# Patient Record
Sex: FEMALE | Race: WHITE | HISPANIC OR LATINO | Employment: STUDENT | URBAN - METROPOLITAN AREA
[De-identification: names, ages, dates, MRNs, and addresses within clinical notes are randomized per-mention and may not be internally consistent; named-entity substitution may affect disease eponyms.]

---

## 2020-01-03 ENCOUNTER — HOSPITAL ENCOUNTER (EMERGENCY)
Facility: HOSPITAL | Age: 30
Discharge: HOME | End: 2020-01-03
Attending: EMERGENCY MEDICINE
Payer: OTHER GOVERNMENT

## 2020-01-03 VITALS
RESPIRATION RATE: 18 BRPM | HEART RATE: 66 BPM | OXYGEN SATURATION: 98 % | TEMPERATURE: 98.1 F | SYSTOLIC BLOOD PRESSURE: 119 MMHG | DIASTOLIC BLOOD PRESSURE: 76 MMHG

## 2020-01-03 DIAGNOSIS — J06.9 URI WITH COUGH AND CONGESTION: Primary | ICD-10-CM

## 2020-01-03 PROCEDURE — 99284 EMERGENCY DEPT VISIT MOD MDM: CPT | Performed by: EMERGENCY MEDICINE

## 2020-01-03 PROCEDURE — 99283 EMERGENCY DEPT VISIT LOW MDM: CPT

## 2020-01-03 RX ORDER — FLUTICASONE PROPIONATE 50 MCG
1 SPRAY, SUSPENSION (ML) NASAL DAILY
Qty: 16 G | Refills: 0 | Status: SHIPPED | OUTPATIENT
Start: 2020-01-03 | End: 2020-03-19

## 2020-01-03 NOTE — ED PROVIDER NOTES
History  Chief Complaint   Patient presents with    URI     Pt  reports cough and congestion ongoing since Saturday  Pt  reports feeling better but still with the bad cough/congestion       History provided by:  Patient  URI   Presenting symptoms: congestion and cough    Presenting symptoms: no fever and no sore throat    Severity:  Moderate  Onset quality:  Gradual  Duration:  6 days  Timing:  Constant  Progression:  Improving (Was worse with the 1st 3 days, over the past 3 days has significantly improved)  Chronicity:  New  Relieved by:  None tried  Worsened by:  Nothing  Ineffective treatments:  None tried  Associated symptoms: myalgias    Associated symptoms: no headaches, no neck pain and no wheezing        None       History reviewed  No pertinent past medical history  History reviewed  No pertinent surgical history  History reviewed  No pertinent family history  I have reviewed and agree with the history as documented  Social History     Tobacco Use    Smoking status: Never Smoker    Smokeless tobacco: Never Used   Substance Use Topics    Alcohol use: Never     Frequency: Never    Drug use: Never        Review of Systems   Constitutional: Negative for activity change, chills, diaphoresis and fever  HENT: Positive for congestion  Negative for sinus pressure and sore throat  Eyes: Negative for pain and visual disturbance  Respiratory: Positive for cough  Negative for chest tightness, shortness of breath, wheezing and stridor  Cardiovascular: Negative for chest pain and palpitations  Gastrointestinal: Negative for abdominal distention, abdominal pain, constipation, diarrhea, nausea and vomiting  Genitourinary: Negative for dysuria and frequency  Musculoskeletal: Positive for myalgias  Negative for neck pain and neck stiffness  Skin: Negative for rash  Neurological: Negative for dizziness, speech difficulty, light-headedness, numbness and headaches         Physical Exam  Physical Exam   Constitutional: She is oriented to person, place, and time  She appears well-developed  No distress  HENT:   Head: Normocephalic and atraumatic  Eyes: Pupils are equal, round, and reactive to light  Neck: Normal range of motion  Neck supple  No tracheal deviation present  Cardiovascular: Normal rate, regular rhythm, normal heart sounds and intact distal pulses  No murmur heard  Pulmonary/Chest: Effort normal and breath sounds normal  No stridor  No respiratory distress  Abdominal: Soft  She exhibits no distension  There is no tenderness  There is no rebound and no guarding  Musculoskeletal: Normal range of motion  Neurological: She is alert and oriented to person, place, and time  Skin: Skin is warm and dry  She is not diaphoretic  No erythema  No pallor  Psychiatric: She has a normal mood and affect  Vitals reviewed  Vital Signs  ED Triage Vitals [01/03/20 0639]   Temperature Pulse Respirations Blood Pressure SpO2   98 1 °F (36 7 °C) 66 18 119/76 98 %      Temp Source Heart Rate Source Patient Position - Orthostatic VS BP Location FiO2 (%)   Oral Monitor Lying Right arm --      Pain Score       No Pain           Vitals:    01/03/20 0639   BP: 119/76   Pulse: 66   Patient Position - Orthostatic VS: Lying         Visual Acuity      ED Medications  Medications - No data to display    Diagnostic Studies  Results Reviewed     None                 No orders to display              Procedures  Procedures         ED Course                               MDM  Number of Diagnoses or Management Options  URI with cough and congestion: new and requires workup  Diagnosis management comments: 71-year-old female, URI type symptoms, been occurring for the past 6 days significantly improved over the past 3 days  , likely influenza out of it Tamiflu treatment window, will discharge       Amount and/or Complexity of Data Reviewed  Review and summarize past medical records: yes          Disposition  Final diagnoses:   URI with cough and congestion     Time reflects when diagnosis was documented in both MDM as applicable and the Disposition within this note     Time User Action Codes Description Comment    1/3/2020  7:33 AM Abraham Busch [J06 [id number]] URI with cough and congestion       ED Disposition     ED Disposition Condition Date/Time Comment    Discharge Stable Fri Guanaco 3, 2020  7:33 AM Choco Beverage discharge to home/self care  Follow-up Information    None         Discharge Medication List as of 1/3/2020  7:33 AM      START taking these medications    Details   fluticasone (FLONASE) 50 mcg/act nasal spray 1 spray into each nostril daily, Starting Fri 1/3/2020, Until Sat 1/2/2021, Print           No discharge procedures on file      ED Provider  Electronically Signed by           Lilliana Hugo DO  01/03/20 1137

## 2020-01-09 ENCOUNTER — OFFICE VISIT (OUTPATIENT)
Dept: INTERNAL MEDICINE CLINIC | Facility: CLINIC | Age: 30
End: 2020-01-09
Payer: OTHER GOVERNMENT

## 2020-01-09 VITALS
RESPIRATION RATE: 18 BRPM | WEIGHT: 227.8 LBS | OXYGEN SATURATION: 98 % | TEMPERATURE: 98.6 F | BODY MASS INDEX: 36.61 KG/M2 | HEART RATE: 78 BPM | HEIGHT: 66 IN | SYSTOLIC BLOOD PRESSURE: 112 MMHG | DIASTOLIC BLOOD PRESSURE: 76 MMHG

## 2020-01-09 DIAGNOSIS — E28.2 PCOS (POLYCYSTIC OVARIAN SYNDROME): ICD-10-CM

## 2020-01-09 DIAGNOSIS — Z00.00 ANNUAL PHYSICAL EXAM: Primary | ICD-10-CM

## 2020-01-09 DIAGNOSIS — Z23 NEED FOR VACCINATION: ICD-10-CM

## 2020-01-09 PROCEDURE — 90686 IIV4 VACC NO PRSV 0.5 ML IM: CPT | Performed by: NURSE PRACTITIONER

## 2020-01-09 PROCEDURE — 99385 PREV VISIT NEW AGE 18-39: CPT | Performed by: NURSE PRACTITIONER

## 2020-01-09 PROCEDURE — 90471 IMMUNIZATION ADMIN: CPT | Performed by: NURSE PRACTITIONER

## 2020-01-09 NOTE — PROGRESS NOTES
[address] [facility]    NAME: Lilly Mart  AGE: 30 y o  SEX: female  : 1990     DATE: 2020     Assessment and Plan:     Problem List Items Addressed This Visit     None      Visit Diagnoses     Annual physical exam    -  Primary    Relevant Orders    Comprehensive metabolic panel    Lipid Panel with Direct LDL reflex    PCOS (polycystic ovarian syndrome)        Relevant Orders    CBC and differential    TSH, 3rd generation with Free T4 reflex    Prolactin    FSH and LH    Testosterone, free, total    Need for vaccination        Relevant Orders    influenza vaccine, [id number], quadrivalent, 0 5 mL, preservative-free, for adult and pediatric patients 6 mos+ (AFLURIA, FLUARIX, FLULAVAL, FLUZONE)          Immunizations and preventive care screenings were discussed with patient today  Appropriate education was printed on patient's after visit summary  Counseling:  Alcohol/drug use: discussed moderation in alcohol intake, the recommendations for healthy alcohol use, and avoidance of illicit drug use  Dental Health: discussed importance of regular tooth brushing, flossing, and dental visits  Injury prevention: discussed safety/seat belts, safety helmets, smoke detectors, carbon dioxide detectors, and smoking near bedding or upholstery  Sexual health: discussed sexually transmitted diseases, partner selection, use of condoms, avoidance of unintended pregnancy, and contraceptive alternatives  · Exercise: the importance of regular exercise/physical activity was discussed  Recommend exercise 3-5 times per week for at least 30 minutes  BMI Counseling: Body mass index is 37 33 kg/m²  The BMI is above normal  Nutrition recommendations include decreasing portion sizes, encouraging healthy choices of fruits and vegetables, consuming healthier snacks, limiting drinks that contain sugar and moderation in carbohydrate intake   Exercise recommendations include exercising 3-5 times per week  Return in about 1 year (around 2021) for Annual physical      Chief Complaint:     Chief Complaint   Patient presents with    SLP Initial Evaluation    Cough      History of Present Illness:     Adult Annual Physical   Patient here for a comprehensive physical exam  The patient reports no problems  Recently moved here from [place], going to [place]    She recently had URI, feels better, still has a dry cough, no fevers   She was diagnosed with PCOS 2 years ago, currently not on any medication, looking to start a family soon, hx of 4 pregnancies with 3  labor/1 miscarriage  Smoked for 6 years 1 ppd, quit 2 years ago     Diet and Physical Activity  · Diet/Nutrition: poor diet  · Exercise: no formal exercise  Depression Screening  PHQ-9 Depression Screening    PHQ-9:    Frequency of the following problems over the past two weeks:       Little interest or pleasure in doing things:  0 - not at all  Feeling down, depressed, or hopeless:  0 - not at all  PHQ-2 Score:  0       General Health  · Sleep: sleeps well and gets 7-8 hours of sleep on average  · Hearing: normal - none   · Vision: goes for regular eye exams, most recent eye exam <1 year ago and wears contacts  · Dental: no dental visits for >1 year  /GYN Health  · Last menstrual period: irregular  · Contraceptive method: none     Review of Systems:     Review of Systems   Constitutional: Negative for activity change, appetite change, fatigue and unexpected weight change  Eyes: Negative for visual disturbance  Respiratory: Positive for cough  Negative for chest tightness, shortness of breath and wheezing  Cardiovascular: Negative for chest pain, palpitations and leg swelling  Gastrointestinal: Negative for abdominal pain, blood in stool, constipation and diarrhea  Genitourinary: Positive for menstrual problem   Negative for difficulty urinating and pelvic pain  Musculoskeletal: Negative for arthralgias  Skin: Negative for rash  Neurological: Negative for dizziness, weakness, light-headedness and headaches  Psychiatric/Behavioral: Negative for sleep disturbance  Past Medical History:     Past Medical History:   Diagnosis Date    PCOS (polycystic ovarian syndrome)     Seasonal allergies       Past Surgical History:     History reviewed  No pertinent surgical history     Social History:     Social History     Socioeconomic History    Marital status: /Civil Union     Spouse name: None    Number of children: None    Years of education: None    Highest education level: None   Occupational History    None   Social Needs    Financial resource strain: None    Food insecurity:     Worry: None     Inability: None    Transportation needs:     Medical: None     Non-medical: None   Tobacco Use    Smoking status: Never Smoker    Smokeless tobacco: Never Used   Substance and Sexual Activity    Alcohol use: Yes     Frequency: Monthly or less     Drinks per session: 1 or 2     Binge frequency: Never     Comment: social    Drug use: Never    Sexual activity: None   Lifestyle    Physical activity:     Days per week: None     Minutes per session: None    Stress: None   Relationships    Social connections:     Talks on phone: None     Gets together: None     Attends Confucianism service: None     Active member of club or organization: None     Attends meetings of clubs or organizations: None     Relationship status: None    Intimate partner violence:     Fear of current or ex partner: None     Emotionally abused: None     Physically abused: None     Forced sexual activity: None   Other Topics Concern    None   Social History Narrative    Drinks coffee daily       Family History:     Family History   Problem Relation Age of Onset    Asthma Mother     Arthritis Mother     Asthma Sister     Diabetes Maternal Grandmother     Osteoporosis Maternal Grandmother     Arthritis Maternal Grandmother     Hypertension Paternal Grandmother       Current Medications:     Current Outpatient Medications   Medication Sig Dispense Refill    Inositol-D Chiro-Inositol (OVASITOL PO) Take by mouth      fluticasone (FLONASE) 50 mcg/act nasal spray 1 spray into each nostril daily (Patient not taking: Reported on 1/9/2020) 16 g 0     No current facility-administered medications for this visit  Allergies:     No Known Allergies   Physical Exam:     /76   Pulse 78   Temp 98 6 °F (37 °C)   Resp 18   Ht 5' 5 5" (1 664 m)   Wt 103 kg (227 lb 12 8 oz)   LMP 01/01/2020   SpO2 98%   BMI 37 33 kg/m²     Physical Exam   Constitutional: She is oriented to person, place, and time  She appears well-developed and well-nourished  HENT:   Head: Normocephalic and atraumatic  Mouth/Throat: Oropharynx is clear and moist    Eyes: Pupils are equal, round, and reactive to light  Conjunctivae are normal    Neck: No thyromegaly present  Cardiovascular: Normal rate, regular rhythm, normal heart sounds and intact distal pulses  Pulmonary/Chest: Effort normal and breath sounds normal    Abdominal: Soft  Bowel sounds are normal    Musculoskeletal: Normal range of motion  She exhibits no edema  Lymphadenopathy:     She has no cervical adenopathy  Neurological: She is alert and oriented to person, place, and time  Skin: Skin is warm and dry  Hirsutism on noted on face    Psychiatric: She has a normal mood and affect  Her behavior is normal    Vitals reviewed        [facility]

## 2020-01-09 NOTE — PROGRESS NOTES
Assessment/Plan:    No problem-specific Assessment & Plan notes found for this encounter  There are no diagnoses linked to this encounter  Subjective:      Patient ID: Vonda Lacy is a 30 y o  female  Here today for establish care  Cough       Abnormal hair growth? Acne? Abnormal periods?      Beta HCG  Cbc sed rate crp tsh, cmp  fsh lh  Prolactin   Diagnosed 2 years ago         The following portions of the patient's history were reviewed and updated as appropriate: allergies, current medications, past family history, past medical history, past social history, past surgical history and problem list     Review of Systems      Objective:      /76   Pulse 78   Temp 98 6 °F (37 °C)   Resp 18   Ht 5' 5 5" (1 664 m)   Wt 103 kg (227 lb 12 8 oz)   LMP 01/01/2020   SpO2 98%   BMI 37 33 kg/m²          Physical Exam

## 2020-01-10 ENCOUNTER — TRANSCRIBE ORDERS (OUTPATIENT)
Dept: LAB | Facility: CLINIC | Age: 30
End: 2020-01-10

## 2020-01-10 ENCOUNTER — APPOINTMENT (OUTPATIENT)
Dept: LAB | Facility: CLINIC | Age: 30
End: 2020-01-10
Payer: OTHER GOVERNMENT

## 2020-01-10 DIAGNOSIS — Z00.00 ANNUAL PHYSICAL EXAM: ICD-10-CM

## 2020-01-10 DIAGNOSIS — E28.2 PCOS (POLYCYSTIC OVARIAN SYNDROME): ICD-10-CM

## 2020-01-10 LAB
ALBUMIN SERPL BCP-MCNC: 3.5 G/DL (ref 3.5–5)
ALP SERPL-CCNC: 64 U/L (ref 46–116)
ALT SERPL W P-5'-P-CCNC: 22 U/L (ref 12–78)
ANION GAP SERPL CALCULATED.3IONS-SCNC: 6 MMOL/L (ref 4–13)
AST SERPL W P-5'-P-CCNC: 5 U/L (ref 5–45)
BASOPHILS # BLD AUTO: 0.04 THOUSANDS/ΜL (ref 0–0.1)
BASOPHILS NFR BLD AUTO: 1 % (ref 0–1)
BILIRUB SERPL-MCNC: 0.29 MG/DL (ref 0.2–1)
BUN SERPL-MCNC: 9 MG/DL (ref 5–25)
CALCIUM SERPL-MCNC: 8.7 MG/DL (ref 8.3–10.1)
CHLORIDE SERPL-SCNC: 104 MMOL/L (ref 100–108)
CHOLEST SERPL-MCNC: 191 MG/DL (ref 50–200)
CO2 SERPL-SCNC: 29 MMOL/L (ref 21–32)
CREAT SERPL-MCNC: 0.57 MG/DL (ref 0.6–1.3)
EOSINOPHIL # BLD AUTO: 0.14 THOUSAND/ΜL (ref 0–0.61)
EOSINOPHIL NFR BLD AUTO: 2 % (ref 0–6)
ERYTHROCYTE [DISTWIDTH] IN BLOOD BY AUTOMATED COUNT: 13.6 % (ref 11.6–15.1)
FSH SERPL-ACNC: 5.3 MIU/ML
GFR SERPL CREATININE-BSD FRML MDRD: 127 ML/MIN/1.73SQ M
GLUCOSE P FAST SERPL-MCNC: 100 MG/DL (ref 65–99)
HCT VFR BLD AUTO: 44 % (ref 34.8–46.1)
HDLC SERPL-MCNC: 61 MG/DL
HGB BLD-MCNC: 13.9 G/DL (ref 11.5–15.4)
IMM GRANULOCYTES # BLD AUTO: 0.01 THOUSAND/UL (ref 0–0.2)
IMM GRANULOCYTES NFR BLD AUTO: 0 % (ref 0–2)
LDLC SERPL CALC-MCNC: 112 MG/DL (ref 0–100)
LH SERPL-ACNC: 6.7 MIU/ML
LYMPHOCYTES # BLD AUTO: 2.75 THOUSANDS/ΜL (ref 0.6–4.47)
LYMPHOCYTES NFR BLD AUTO: 41 % (ref 14–44)
MCH RBC QN AUTO: 28.1 PG (ref 26.8–34.3)
MCHC RBC AUTO-ENTMCNC: 31.6 G/DL (ref 31.4–37.4)
MCV RBC AUTO: 89 FL (ref 82–98)
MONOCYTES # BLD AUTO: 0.42 THOUSAND/ΜL (ref 0.17–1.22)
MONOCYTES NFR BLD AUTO: 6 % (ref 4–12)
NEUTROPHILS # BLD AUTO: 3.39 THOUSANDS/ΜL (ref 1.85–7.62)
NEUTS SEG NFR BLD AUTO: 50 % (ref 43–75)
NRBC BLD AUTO-RTO: 0 /100 WBCS
PLATELET # BLD AUTO: 226 THOUSANDS/UL (ref 149–390)
PMV BLD AUTO: 11.2 FL (ref 8.9–12.7)
POTASSIUM SERPL-SCNC: 4.4 MMOL/L (ref 3.5–5.3)
PROLACTIN SERPL-MCNC: 9.5 NG/ML
PROT SERPL-MCNC: 8 G/DL (ref 6.4–8.2)
RBC # BLD AUTO: 4.94 MILLION/UL (ref 3.81–5.12)
SODIUM SERPL-SCNC: 139 MMOL/L (ref 136–145)
TRIGL SERPL-MCNC: 88 MG/DL
TSH SERPL DL<=0.05 MIU/L-ACNC: 1.25 UIU/ML (ref 0.36–3.74)
WBC # BLD AUTO: 6.75 THOUSAND/UL (ref 4.31–10.16)

## 2020-01-10 PROCEDURE — 84146 ASSAY OF PROLACTIN: CPT

## 2020-01-10 PROCEDURE — 85025 COMPLETE CBC W/AUTO DIFF WBC: CPT

## 2020-01-10 PROCEDURE — 80061 LIPID PANEL: CPT

## 2020-01-10 PROCEDURE — 83001 ASSAY OF GONADOTROPIN (FSH): CPT

## 2020-01-10 PROCEDURE — 80053 COMPREHEN METABOLIC PANEL: CPT

## 2020-01-10 PROCEDURE — 83002 ASSAY OF GONADOTROPIN (LH): CPT

## 2020-01-10 PROCEDURE — 36415 COLL VENOUS BLD VENIPUNCTURE: CPT

## 2020-01-10 PROCEDURE — 84403 ASSAY OF TOTAL TESTOSTERONE: CPT

## 2020-01-10 PROCEDURE — 84402 ASSAY OF FREE TESTOSTERONE: CPT

## 2020-01-10 PROCEDURE — 84443 ASSAY THYROID STIM HORMONE: CPT

## 2020-01-13 LAB
TESTOST FREE SERPL-MCNC: 5.4 PG/ML (ref 0–4.2)
TESTOST SERPL-MCNC: 53 NG/DL (ref 8–48)

## 2020-03-19 ENCOUNTER — OFFICE VISIT (OUTPATIENT)
Dept: OBGYN CLINIC | Facility: CLINIC | Age: 30
End: 2020-03-19
Payer: OTHER GOVERNMENT

## 2020-03-19 VITALS — WEIGHT: 226 LBS | BODY MASS INDEX: 36.32 KG/M2 | HEIGHT: 66 IN

## 2020-03-19 DIAGNOSIS — E28.2 PCOS (POLYCYSTIC OVARIAN SYNDROME): ICD-10-CM

## 2020-03-19 DIAGNOSIS — N96 RECURRENT PREGNANCY LOSS: ICD-10-CM

## 2020-03-19 DIAGNOSIS — Z12.4 ENCOUNTER FOR SCREENING FOR MALIGNANT NEOPLASM OF CERVIX: ICD-10-CM

## 2020-03-19 DIAGNOSIS — Z01.419 WELL FEMALE EXAM WITH ROUTINE GYNECOLOGICAL EXAM: Primary | ICD-10-CM

## 2020-03-19 PROCEDURE — 99385 PREV VISIT NEW AGE 18-39: CPT | Performed by: OBSTETRICS & GYNECOLOGY

## 2020-03-19 PROCEDURE — G0145 SCR C/V CYTO,THINLAYER,RESCR: HCPCS | Performed by: OBSTETRICS & GYNECOLOGY

## 2020-03-19 RX ORDER — DIPHENOXYLATE HYDROCHLORIDE AND ATROPINE SULFATE 2.5; .025 MG/1; MG/1
1 TABLET ORAL DAILY
COMMUNITY
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
## 2020-03-27 ENCOUNTER — LAB (OUTPATIENT)
Dept: LAB | Facility: CLINIC | Age: 30
End: 2020-03-27
Payer: OTHER GOVERNMENT

## 2020-03-27 ENCOUNTER — TRANSCRIBE ORDERS (OUTPATIENT)
Dept: LAB | Facility: CLINIC | Age: 30
End: 2020-03-27

## 2020-03-27 DIAGNOSIS — N96 RECURRENT PREGNANCY LOSS: ICD-10-CM

## 2020-03-27 DIAGNOSIS — E28.2 PCOS (POLYCYSTIC OVARIAN SYNDROME): ICD-10-CM

## 2020-03-27 LAB
LAB AP GYN PRIMARY INTERPRETATION: NORMAL
Lab: NORMAL

## 2020-03-27 PROCEDURE — 83001 ASSAY OF GONADOTROPIN (FSH): CPT

## 2020-03-27 PROCEDURE — 85613 RUSSELL VIPER VENOM DILUTED: CPT

## 2020-03-27 PROCEDURE — 82670 ASSAY OF TOTAL ESTRADIOL: CPT

## 2020-03-27 PROCEDURE — 84144 ASSAY OF PROGESTERONE: CPT

## 2020-03-27 PROCEDURE — 86147 CARDIOLIPIN ANTIBODY EA IG: CPT

## 2020-03-27 PROCEDURE — 36415 COLL VENOUS BLD VENIPUNCTURE: CPT

## 2020-03-27 PROCEDURE — 85732 THROMBOPLASTIN TIME PARTIAL: CPT

## 2020-03-27 PROCEDURE — 83036 HEMOGLOBIN GLYCOSYLATED A1C: CPT

## 2020-03-27 PROCEDURE — 86146 BETA-2 GLYCOPROTEIN ANTIBODY: CPT

## 2020-03-27 PROCEDURE — 85705 THROMBOPLASTIN INHIBITION: CPT

## 2020-03-27 PROCEDURE — 85670 THROMBIN TIME PLASMA: CPT

## 2020-03-28 LAB
EST. AVERAGE GLUCOSE BLD GHB EST-MCNC: 120 MG/DL
ESTRADIOL SERPL-MCNC: 39 PG/ML
FSH SERPL-ACNC: 5.2 MIU/ML
HBA1C MFR BLD: 5.8 %
PROGEST SERPL-MCNC: 0.7 NG/ML

## 2020-03-30 LAB
APTT SCREEN TO CONFIRM RATIO: 1.03 RATIO (ref 0–1.4)
B2 GLYCOPROT1 IGA SER-ACNC: <9 GPI IGA UNITS (ref 0–25)
B2 GLYCOPROT1 IGG SER-ACNC: <9 GPI IGG UNITS (ref 0–20)
B2 GLYCOPROT1 IGM SER-ACNC: <9 GPI IGM UNITS (ref 0–32)
CARDIOLIPIN IGA SER IA-ACNC: <9 APL U/ML (ref 0–11)
CARDIOLIPIN IGG SER IA-ACNC: <9 GPL U/ML (ref 0–14)
CARDIOLIPIN IGM SER IA-ACNC: 10 MPL U/ML (ref 0–12)
CONFIRM APTT/NORMAL: 44.1 SEC (ref 0–55)
LA PPP-IMP: NORMAL
SCREEN APTT: 43.3 SEC (ref 0–51.9)
SCREEN DRVVT: 46.4 SEC (ref 0–47)
THROMBIN TIME: 15.6 SEC (ref 0–23)

## 2020-03-31 DIAGNOSIS — E28.2 PCOS (POLYCYSTIC OVARIAN SYNDROME): Primary | ICD-10-CM

## 2020-03-31 DIAGNOSIS — N96 RECURRENT PREGNANCY LOSS: ICD-10-CM

## 2020-03-31 NOTE — RESULT ENCOUNTER NOTE
No antiphospholipid antibodies  Progesterone low -- likely no ovulation, consistent with PCOS  E2 and FSH reordered for day 3 of next cycle

## 2020-04-17 ENCOUNTER — TELEPHONE (OUTPATIENT)
Dept: OTHER | Facility: OTHER | Age: 30
End: 2020-04-17

## 2020-06-14 ENCOUNTER — TRANSCRIBE ORDERS (OUTPATIENT)
Dept: LAB | Facility: CLINIC | Age: 30
End: 2020-06-14

## 2020-06-14 ENCOUNTER — LAB (OUTPATIENT)
Dept: LAB | Facility: CLINIC | Age: 30
End: 2020-06-14
Payer: OTHER GOVERNMENT

## 2020-06-14 DIAGNOSIS — E28.2 PCOS (POLYCYSTIC OVARIAN SYNDROME): ICD-10-CM

## 2020-06-14 LAB
ESTRADIOL SERPL-MCNC: 37 PG/ML
FSH SERPL-ACNC: 5.2 MIU/ML

## 2020-06-14 PROCEDURE — 83001 ASSAY OF GONADOTROPIN (FSH): CPT

## 2020-06-14 PROCEDURE — 82670 ASSAY OF TOTAL ESTRADIOL: CPT

## 2020-06-14 PROCEDURE — 83516 IMMUNOASSAY NONANTIBODY: CPT

## 2020-06-14 PROCEDURE — 36415 COLL VENOUS BLD VENIPUNCTURE: CPT

## 2020-06-18 LAB — MIS SERPL-MCNC: 5.92 NG/ML

## 2022-01-16 ENCOUNTER — HOSPITAL ENCOUNTER (EMERGENCY)
Facility: HOSPITAL | Age: 32
Discharge: HOME | End: 2022-01-16
Attending: EMERGENCY MEDICINE
Payer: OTHER GOVERNMENT

## 2022-01-16 VITALS
OXYGEN SATURATION: 98 % | HEART RATE: 90 BPM | SYSTOLIC BLOOD PRESSURE: 136 MMHG | TEMPERATURE: 98.7 F | DIASTOLIC BLOOD PRESSURE: 78 MMHG | RESPIRATION RATE: 18 BRPM

## 2022-01-16 DIAGNOSIS — U07.1 COVID-19: Primary | ICD-10-CM

## 2022-01-16 PROCEDURE — 99284 EMERGENCY DEPT VISIT MOD MDM: CPT | Performed by: EMERGENCY MEDICINE

## 2022-01-16 PROCEDURE — 99284 EMERGENCY DEPT VISIT MOD MDM: CPT

## 2022-01-17 NOTE — ED PROVIDER NOTES
History  Chief Complaint   Patient presents with    Shortness of Breath     C/o SOB, nasal congestion, fever and cough starting yesterday  Tested positive for covid  Dago Cousins is a 28 y o  female who presents with chief complaint of a fever, cough, nasal congestion and body aches that started yesterday  He  had similar symptoms start a few days prior  She was tested yesterday for COVID and tested positive  She does not have any respiratory distress and denies shortness of breath  Her oxygen saturation is 98% on room air  She has no pulmonary disease at baseline  She is not immunized  She has been taking Emergen-C OTC medications  History provided by:  Patient   used: No    URI  Presenting symptoms: congestion, cough, fatigue, fever and sore throat    Severity:  Mild  Onset quality:  Gradual  Duration:  2 days  Timing:  Constant  Progression:  Unchanged  Chronicity:  New  Relieved by:  OTC medications  Worsened by:  Nothing  Ineffective treatments:  None tried  Associated symptoms: myalgias    Risk factors: sick contacts        Prior to Admission Medications   Prescriptions Last Dose Informant Patient Reported? Taking?    Inositol-D Chiro-Inositol (OVASITOL PO)  Self Yes No   Sig: Take by mouth   multivitamin (THERAGRAN) TABS   Yes No   Sig: Take 1 tablet by mouth daily      Facility-Administered Medications: None       Past Medical History:   Diagnosis Date    Heart murmur 2010    PCOS (polycystic ovarian syndrome)     Seasonal allergies        Past Surgical History:   Procedure Laterality Date    CERVICAL CERCLAGE  2009    FOOT SURGERY Right        Family History   Problem Relation Age of Onset    Asthma Mother     Arthritis Mother     Asthma Sister     Diabetes Maternal Grandmother     Osteoporosis Maternal Grandmother     Arthritis Maternal Grandmother     Hypertension Paternal Grandmother      I have reviewed and agree with the history as documented  E-Cigarette/Vaping    E-Cigarette Use Former User     Comments quit 2016      E-Cigarette/Vaping Substances    Nicotine No     THC No     CBD No     Flavoring No     Other No     Unknown No      Social History     Tobacco Use    Smoking status: Former Smoker    Smokeless tobacco: Never Used   Vaping Use    Vaping Use: Former   Substance Use Topics    Alcohol use: Yes     Comment: social    Drug use: Never       Review of Systems   Constitutional: Positive for fatigue and fever  Negative for chills and diaphoresis  HENT: Positive for congestion and sore throat  Respiratory: Positive for cough  Negative for shortness of breath  Cardiovascular: Negative for chest pain and palpitations  Gastrointestinal: Negative for diarrhea, nausea and vomiting  Genitourinary: Negative for dysuria and frequency  Musculoskeletal: Positive for myalgias  Skin: Negative for rash  All other systems reviewed and are negative  Physical Exam  Physical Exam  Vitals and nursing note reviewed  Constitutional:       General: She is not in acute distress  Appearance: She is well-developed  HENT:      Head: Normocephalic and atraumatic  Eyes:      Pupils: Pupils are equal, round, and reactive to light  Neck:      Vascular: No JVD  Cardiovascular:      Rate and Rhythm: Normal rate and regular rhythm  Heart sounds: Normal heart sounds  No murmur heard  No friction rub  No gallop  Pulmonary:      Effort: Pulmonary effort is normal  No respiratory distress  Breath sounds: Normal breath sounds  No wheezing or rales  Chest:      Chest wall: No tenderness  Musculoskeletal:         General: No tenderness  Normal range of motion  Cervical back: Normal range of motion  Skin:     General: Skin is warm and dry  Neurological:      General: No focal deficit present  Mental Status: She is alert and oriented to person, place, and time     Psychiatric:         Behavior: Behavior normal          Thought Content: Thought content normal          Judgment: Judgment normal          Vital Signs  ED Triage Vitals   Temperature Pulse Respirations Blood Pressure SpO2   01/16/22 2129 01/16/22 2128 01/16/22 2128 01/16/22 2128 01/16/22 2128   98 7 °F (37 1 °C) 90 18 136/78 98 %      Temp Source Heart Rate Source Patient Position - Orthostatic VS BP Location FiO2 (%)   01/16/22 2128 01/16/22 2128 01/16/22 2128 01/16/22 2128 --   Oral Monitor Sitting Left arm       Pain Score       --                  Vitals:    01/16/22 2128   BP: 136/78   Pulse: 90   Patient Position - Orthostatic VS: Sitting         Visual Acuity      ED Medications  Medications - No data to display    Diagnostic Studies  Results Reviewed     None                 No orders to display              Procedures  Procedures         ED Course                               SBIRT 20yo+      Most Recent Value   SBIRT (23 yo +)    In order to provide better care to our patients, we are screening all of our patients for alcohol and drug use  Would it be okay to ask you these screening questions? No Filed at: 01/16/2022 2141                    [facility]  Number of Diagnoses or Management Options  COVID-19: new and does not require workup  Diagnosis management comments: Patient with known COVID presenting with symptoms consistent with COVID  Nothing to suggest severe disease at this time  Counseling regarding best supportive therapies and reasons for return given  Patient Progress  Patient progress: stable      Disposition  Final diagnoses:   [id number]     Time reflects when diagnosis was documented in both MDM as applicable and the Disposition within this note     Time User Action Codes Description Comment    1/16/2022  9:47 PM Lisa Lopez Add [U07 1] COVID-19       ED Disposition     ED Disposition Condition Date/Time Comment    Discharge Stable Sun Jan 16, 2022  9:47 PM Maurice Flowers discharge to home/self care              Follow-up Information     Follow up With Specialties Details Why [address] [facility] [place] Internal Medicine, Nurse Practitioner Schedule an appointment as soon as possible for a visit in 1 week  Red Harrison [address] [place] [address]  [phone]            Patient's Medications   Discharge Prescriptions    No medications on file       No discharge procedures on file      PDMP Review     None          ED Provider  Electronically Signed by           Rohit Ryan MD  01/16/22 5854

## 2022-01-21 ENCOUNTER — APPOINTMENT (EMERGENCY)
Dept: RADIOLOGY | Facility: HOSPITAL | Age: 32
End: 2022-01-21
Payer: OTHER GOVERNMENT

## 2022-01-21 ENCOUNTER — HOSPITAL ENCOUNTER (EMERGENCY)
Facility: HOSPITAL | Age: 32
Discharge: HOME | End: 2022-01-21
Attending: EMERGENCY MEDICINE
Payer: OTHER GOVERNMENT

## 2022-01-21 VITALS
OXYGEN SATURATION: 98 % | DIASTOLIC BLOOD PRESSURE: 76 MMHG | TEMPERATURE: 98.7 F | RESPIRATION RATE: 20 BRPM | HEART RATE: 72 BPM | SYSTOLIC BLOOD PRESSURE: 124 MMHG

## 2022-01-21 DIAGNOSIS — R09.81 NASAL CONGESTION: ICD-10-CM

## 2022-01-21 DIAGNOSIS — J06.9 URI (UPPER RESPIRATORY INFECTION): Primary | ICD-10-CM

## 2022-01-21 LAB
ALBUMIN SERPL BCP-MCNC: 3.5 G/DL (ref 3.5–5)
ALP SERPL-CCNC: 54 U/L (ref 46–116)
ALT SERPL W P-5'-P-CCNC: 24 U/L (ref 12–78)
ANION GAP SERPL CALCULATED.3IONS-SCNC: 8 MMOL/L (ref 4–13)
AST SERPL W P-5'-P-CCNC: 15 U/L (ref 5–45)
ATRIAL RATE: 69 BPM
BASOPHILS # BLD AUTO: 0.01 THOUSANDS/ΜL (ref 0–0.1)
BASOPHILS NFR BLD AUTO: 0 % (ref 0–1)
BILIRUB SERPL-MCNC: <0.1 MG/DL (ref 0.2–1)
BUN SERPL-MCNC: 8 MG/DL (ref 5–25)
CALCIUM SERPL-MCNC: 9.2 MG/DL (ref 8.3–10.1)
CARDIAC TROPONIN I PNL SERPL HS: 2 NG/L
CHLORIDE SERPL-SCNC: 103 MMOL/L (ref 100–108)
CO2 SERPL-SCNC: 29 MMOL/L (ref 21–32)
CREAT SERPL-MCNC: 0.64 MG/DL (ref 0.6–1.3)
EOSINOPHIL # BLD AUTO: 0.04 THOUSAND/ΜL (ref 0–0.61)
EOSINOPHIL NFR BLD AUTO: 1 % (ref 0–6)
ERYTHROCYTE [DISTWIDTH] IN BLOOD BY AUTOMATED COUNT: 13.9 % (ref 11.6–15.1)
GFR SERPL CREATININE-BSD FRML MDRD: 120 ML/MIN/1.73SQ M
GLUCOSE SERPL-MCNC: 94 MG/DL (ref 65–140)
HCT VFR BLD AUTO: 40.8 % (ref 34.8–46.1)
HGB BLD-MCNC: 13.4 G/DL (ref 11.5–15.4)
IMM GRANULOCYTES # BLD AUTO: 0.01 THOUSAND/UL (ref 0–0.2)
IMM GRANULOCYTES NFR BLD AUTO: 0 % (ref 0–2)
LYMPHOCYTES # BLD AUTO: 2.46 THOUSANDS/ΜL (ref 0.6–4.47)
LYMPHOCYTES NFR BLD AUTO: 55 % (ref 14–44)
MCH RBC QN AUTO: 28.6 PG (ref 26.8–34.3)
MCHC RBC AUTO-ENTMCNC: 32.8 G/DL (ref 31.4–37.4)
MCV RBC AUTO: 87 FL (ref 82–98)
MONOCYTES # BLD AUTO: 0.28 THOUSAND/ΜL (ref 0.17–1.22)
MONOCYTES NFR BLD AUTO: 6 % (ref 4–12)
NEUTROPHILS # BLD AUTO: 1.71 THOUSANDS/ΜL (ref 1.85–7.62)
NEUTS SEG NFR BLD AUTO: 38 % (ref 43–75)
NRBC BLD AUTO-RTO: 0 /100 WBCS
P AXIS: 52 DEGREES
PLATELET # BLD AUTO: 198 THOUSANDS/UL (ref 149–390)
PMV BLD AUTO: 10.9 FL (ref 8.9–12.7)
POTASSIUM SERPL-SCNC: 3.9 MMOL/L (ref 3.5–5.3)
PR INTERVAL: 138 MS
PROT SERPL-MCNC: 7.8 G/DL (ref 6.4–8.2)
QRS AXIS: 5 DEGREES
QRSD INTERVAL: 86 MS
QT INTERVAL: 388 MS
QTC INTERVAL: 407 MS
RBC # BLD AUTO: 4.69 MILLION/UL (ref 3.81–5.12)
SODIUM SERPL-SCNC: 140 MMOL/L (ref 136–145)
T WAVE AXIS: 13 DEGREES
VENTRICULAR RATE: 66 BPM
WBC # BLD AUTO: 4.51 THOUSAND/UL (ref 4.31–10.16)

## 2022-01-21 PROCEDURE — 93010 ELECTROCARDIOGRAM REPORT: CPT | Performed by: INTERNAL MEDICINE

## 2022-01-21 PROCEDURE — 96360 HYDRATION IV INFUSION INIT: CPT

## 2022-01-21 PROCEDURE — 80053 COMPREHEN METABOLIC PANEL: CPT | Performed by: PHYSICIAN ASSISTANT

## 2022-01-21 PROCEDURE — 36415 COLL VENOUS BLD VENIPUNCTURE: CPT | Performed by: PHYSICIAN ASSISTANT

## 2022-01-21 PROCEDURE — 93005 ELECTROCARDIOGRAM TRACING: CPT

## 2022-01-21 PROCEDURE — 84484 ASSAY OF TROPONIN QUANT: CPT | Performed by: PHYSICIAN ASSISTANT

## 2022-01-21 PROCEDURE — 85025 COMPLETE CBC W/AUTO DIFF WBC: CPT | Performed by: PHYSICIAN ASSISTANT

## 2022-01-21 PROCEDURE — 71045 X-RAY EXAM CHEST 1 VIEW: CPT

## 2022-01-21 PROCEDURE — 99285 EMERGENCY DEPT VISIT HI MDM: CPT | Performed by: PHYSICIAN ASSISTANT

## 2022-01-21 PROCEDURE — 99285 EMERGENCY DEPT VISIT HI MDM: CPT

## 2022-01-21 RX ORDER — MELATONIN
2000 DAILY
Qty: 28 TABLET | Refills: 0 | Status: SHIPPED | OUTPATIENT
Start: 2022-01-21 | End: 2022-02-04

## 2022-01-21 RX ORDER — LORATADINE 10 MG/1
10 TABLET ORAL DAILY
Qty: 3 TABLET | Refills: 0 | Status: SHIPPED | OUTPATIENT
Start: 2022-01-21 | End: 2022-01-24

## 2022-01-21 RX ORDER — MULTIVIT WITH MINERALS/LUTEIN
1000 TABLET ORAL 2 TIMES DAILY
Qty: 28 TABLET | Refills: 0 | Status: SHIPPED | OUTPATIENT
Start: 2022-01-21 | End: 2022-02-04

## 2022-01-21 RX ORDER — OXYMETAZOLINE HYDROCHLORIDE 0.05 G/100ML
2 SPRAY NASAL ONCE
Status: COMPLETED | OUTPATIENT
Start: 2022-01-21 | End: 2022-01-21

## 2022-01-21 RX ORDER — ALBUTEROL SULFATE 90 UG/1
2 AEROSOL, METERED RESPIRATORY (INHALATION) ONCE
Status: COMPLETED | OUTPATIENT
Start: 2022-01-21 | End: 2022-01-21

## 2022-01-21 RX ORDER — MULTIVITAMIN
1 TABLET ORAL DAILY
Qty: 14 TABLET | Refills: 0 | Status: SHIPPED | OUTPATIENT
Start: 2022-01-21 | End: 2022-02-04

## 2022-01-21 RX ORDER — LORATADINE 10 MG/1
10 TABLET ORAL ONCE
Status: COMPLETED | OUTPATIENT
Start: 2022-01-21 | End: 2022-01-21

## 2022-01-21 RX ADMIN — SODIUM CHLORIDE 1000 ML: 0.9 INJECTION, SOLUTION INTRAVENOUS at 01:53

## 2022-01-21 RX ADMIN — ALBUTEROL SULFATE 2 PUFF: 90 AEROSOL, METERED RESPIRATORY (INHALATION) at 01:33

## 2022-01-21 RX ADMIN — OXYMETAZOLINE HCL 2 SPRAY: 0.05 SPRAY NASAL at 01:33

## 2022-01-21 RX ADMIN — LORATADINE 10 MG: 10 TABLET ORAL at 01:33

## 2022-01-21 NOTE — DISCHARGE INSTRUCTIONS
Take Claritin trauma multivitamins, vitamin-D vitamin-C as indicated  Use Ventolin inhaler as needed  Use Afrin as indicated; 1 spray each nostril every 12 hours no longer than 3 days  Patient hemodynamically stable and afebrile  Consume plenty fluids and electrolytes  Obtain pulse oximeter and it is advised that you return to the hospital should SP O2 saturation dropped below 92%  Maintain quarantine precautions  Follow-up with PCP  Follow up emergency department symptoms persist or exacerbate

## 2022-01-21 NOTE — Clinical Note
Robin Prieto was seen and treated in our emergency department on 1/21/2022  Diagnosis:     Joe Artis    She may return on this date: 01/24/2022         If you have any questions or concerns, please don't hesitate to call        Phil Mistry PA-C    ______________________________           _______________          _______________  Hospital Representative                              Date                                Time

## 2022-01-21 NOTE — ED PROVIDER NOTES
History  Chief Complaint   Patient presents with    Shortness of Breath     pt reports shortness of breath and tightness in throat that started about 30 min ago  pt tested positive for covid on saturday   Patient is a 45-year-old female with history of PCOS that presents emergency depart with intermittent dry nonproductive cough for 6 days  Patient has associated symptomatology of shortness of breath, dull aching nonradiating throat pain and chest tightness beginning the current presentation of cough  Patient states that she is COVID positive and noted that she had shortness of breath symptoms that a worsening today and she decided to comes emergency department for further evaluation  Patient denies history of DVT, PE, thrombophlebitis  Patient denies palliative factors with provocative factors activity/exertion  Patient affirms not effective treatment of Mucinex  Patient denies fevers, chills, nausea, vomiting, diarrhea, constipation urinary symptoms  Patient's recent fall or recent trauma  Patient denies recent antibiotic use  Patient denies recent travel  Patient affirms possible sick contacts; patient peers  Patient denies chest pain and abdominal pain  History provided by:  Patient   used: No        Prior to Admission Medications   Prescriptions Last Dose Informant Patient Reported? Taking?    Inositol-D Chiro-Inositol (OVASITOL PO)  Self Yes No   Sig: Take by mouth   multivitamin (THERAGRAN) TABS   Yes No   Sig: Take 1 tablet by mouth daily      Facility-Administered Medications: None       Past Medical History:   Diagnosis Date    Heart murmur 2010    PCOS (polycystic ovarian syndrome)     Seasonal allergies        Past Surgical History:   Procedure Laterality Date    CERVICAL CERCLAGE  2009    FOOT SURGERY Right        Family History   Problem Relation Age of Onset    Asthma Mother     Arthritis Mother     Asthma Sister     Diabetes Maternal Grandmother     Osteoporosis Maternal Grandmother     Arthritis Maternal Grandmother     Hypertension Paternal Grandmother      I have reviewed and agree with the history as documented  E-Cigarette/Vaping    E-Cigarette Use Former User     Comments quit 2016      E-Cigarette/Vaping Substances    Nicotine No     THC No     CBD No     Flavoring No     Other No     Unknown No      Social History     Tobacco Use    Smoking status: Former Smoker    Smokeless tobacco: Never Used   Vaping Use    Vaping Use: Former   Substance Use Topics    Alcohol use: Yes     Comment: social    Drug use: Never       Review of Systems    Physical Exam  Physical Exam  Vitals and nursing note reviewed  Constitutional:       General: She is awake  Appearance: Normal appearance  She is well-developed  She is not ill-appearing, toxic-appearing or diaphoretic  Comments: /76 (BP Location: Left arm)   Pulse 72   Temp 98 7 °F (37 1 °C) (Oral)   Resp 20   SpO2 98%      HENT:      Head: Normocephalic and atraumatic  Right Ear: Hearing and external ear normal  No decreased hearing noted  No drainage, swelling or tenderness  No mastoid tenderness  Left Ear: Hearing and external ear normal  No decreased hearing noted  No drainage, swelling or tenderness  No mastoid tenderness  Nose: Nose normal       Mouth/Throat:      Lips: Pink  Mouth: Mucous membranes are moist       Pharynx: Oropharynx is clear  Uvula midline  Eyes:      General: Lids are normal  Vision grossly intact  Right eye: No discharge  Left eye: No discharge  Extraocular Movements: Extraocular movements intact  Conjunctiva/sclera: Conjunctivae normal       Pupils: Pupils are equal, round, and reactive to light  Neck:      Vascular: No JVD  Trachea: Trachea and phonation normal  No tracheal tenderness or tracheal deviation  Cardiovascular:      Rate and Rhythm: Normal rate and regular rhythm        Pulses: Normal pulses  Radial pulses are 2+ on the right side and 2+ on the left side  Heart sounds: Normal heart sounds  Pulmonary:      Effort: Pulmonary effort is normal       Breath sounds: Normal breath sounds  No stridor  No decreased breath sounds, wheezing, rhonchi or rales  Chest:      Chest wall: No tenderness  Abdominal:      General: Abdomen is flat  Bowel sounds are normal  There is no distension  Palpations: Abdomen is soft  Abdomen is not rigid  Tenderness: There is no abdominal tenderness  There is no guarding or rebound  Musculoskeletal:         General: Normal range of motion  Cervical back: Full passive range of motion without pain, normal range of motion and neck supple  No rigidity  No spinous process tenderness or muscular tenderness  Normal range of motion  Right lower leg: Normal       Left lower leg: Normal    Lymphadenopathy:      Head:      Right side of head: No submental, submandibular, tonsillar, preauricular, posterior auricular or occipital adenopathy  Left side of head: No submental, submandibular, tonsillar, preauricular, posterior auricular or occipital adenopathy  Cervical: No cervical adenopathy  Right cervical: No superficial, deep or posterior cervical adenopathy  Left cervical: No superficial, deep or posterior cervical adenopathy  Skin:     General: Skin is warm  Capillary Refill: Capillary refill takes less than 2 seconds  Neurological:      General: No focal deficit present  Mental Status: She is alert and oriented to person, place, and time  GCS: GCS eye subscore is 4  GCS verbal subscore is 5  GCS motor subscore is 6  Sensory: No sensory deficit  Deep Tendon Reflexes: Reflexes are normal and symmetric  Reflex Scores:       Patellar reflexes are 2+ on the right side and 2+ on the left side    Psychiatric:         Mood and Affect: Mood normal          Speech: Speech normal          Behavior: Behavior normal  Behavior is cooperative  Thought Content:  Thought content normal          Judgment: Judgment normal          Vital Signs  ED Triage Vitals [01/21/22 0100]   Temperature Pulse Respirations Blood Pressure SpO2   98 7 °F (37 1 °C) 72 20 124/76 98 %      Temp Source Heart Rate Source Patient Position - Orthostatic VS BP Location FiO2 (%)   Oral Monitor Sitting Left arm --      Pain Score       --           Vitals:    01/21/22 0100   BP: 124/76   Pulse: 72   Patient Position - Orthostatic VS: Sitting         Visual Acuity      ED Medications  Medications   sodium chloride 0 9 % bolus 1,000 mL (0 mL Intravenous Stopped 1/21/22 0253)   albuterol (PROVENTIL HFA,VENTOLIN HFA) inhaler 2 puff (2 puffs Inhalation Given 1/21/22 0133)   loratadine (CLARITIN) tablet 10 mg (10 mg Oral Given 1/21/22 0133)   oxymetazoline (AFRIN) 0 05 % nasal spray 2 spray (2 sprays Each Nare Given 1/21/22 0133)       Diagnostic Studies  Results Reviewed     Procedure Component Value Units Date/Time    HS Troponin 0hr (reflex protocol) [[id number]] Collected: 01/21/22 0154    Lab Status: Final result Specimen: Blood from Arm, Left Updated: 01/21/22 0241     hs TnI 0hr 2 ng/L     Comprehensive metabolic panel [[id number]]  (Abnormal) Collected: 01/21/22 0154    Lab Status: Final result Specimen: Blood from Arm, Left Updated: 01/21/22 0224     Sodium 140 mmol/L      Potassium 3 9 mmol/L      Chloride 103 mmol/L      CO2 29 mmol/L      ANION GAP 8 mmol/L      BUN 8 mg/dL      Creatinine 0 64 mg/dL      Glucose 94 mg/dL      Calcium 9 2 mg/dL      AST 15 U/L      ALT 24 U/L      Alkaline Phosphatase 54 U/L      Total Protein 7 8 g/dL      Albumin 3 5 g/dL      Total Bilirubin <0 10 mg/dL      eGFR 120 ml/min/1 73sq m     Narrative:      [place] guidelines for Chronic Kidney Disease (CKD):     Stage 1 with normal or high GFR (GFR > 90 mL/min/1 73 square meters)    Stage 2 Mild CKD (GFR = 60-89 mL/min/1 73 square meters)    Stage 3A Moderate CKD (GFR = 45-59 mL/min/1 73 square meters)    Stage 3B Moderate CKD (GFR = 30-44 mL/min/1 73 square meters)    Stage 4 Severe CKD (GFR = 15-29 mL/min/1 73 square meters)    Stage 5 End Stage CKD (GFR <15 mL/min/1 73 square meters)  Note: GFR calculation is accurate only with a steady state creatinine    CBC and differential [[id number]]  (Abnormal) Collected: 01/21/22 0154    Lab Status: Final result Specimen: Blood from Arm, Left Updated: 01/21/22 0207     WBC 4 51 Thousand/uL      RBC 4 69 Million/uL      Hemoglobin 13 4 g/dL      Hematocrit 40 8 %      MCV 87 fL      MCH 28 6 pg      MCHC 32 8 g/dL      RDW 13 9 %      MPV 10 9 fL      Platelets 008 Thousands/uL      nRBC 0 /100 WBCs      Neutrophils Relative 38 %      Immat GRANS % 0 %      Lymphocytes Relative 55 %      Monocytes Relative 6 %      Eosinophils Relative 1 %      Basophils Relative 0 %      Neutrophils Absolute 1 71 Thousands/µL      Immature Grans Absolute 0 01 Thousand/uL      Lymphocytes Absolute 2 46 Thousands/µL      Monocytes Absolute 0 28 Thousand/µL      Eosinophils Absolute 0 04 Thousand/µL      Basophils Absolute 0 01 Thousands/µL                  XR chest 1 view portable   ED Interpretation by Domi Yoo PA-C (01/21 0214)   No acute cardiopulmonary disease on initial read                   Procedures  ECG 12 Lead Documentation Only    Date/Time: 1/21/2022 1:43 AM  Performed by: Domi Yoo PA-C  Authorized by: Domi Yoo PA-C     Indications / Diagnosis:  Chest tightness   ECG reviewed by me, the ED Provider: yes    Patient location:  ED  Previous ECG:     Previous ECG:  Unavailable    Comparison to cardiac monitor: Yes    Interpretation:     Interpretation: normal    Rate:     ECG rate:  66    ECG rate assessment: normal    Rhythm:     Rhythm: sinus rhythm    Ectopy:     Ectopy: none    QRS:     QRS axis:  Normal    QRS intervals:  Normal  Conduction:     Conduction: normal ST segments:     ST segments:  Normal  T waves:     T waves: normal               ED Course                       PERC Rule for PE      Most Recent Value   PERC Rule for PE    Age >=50 0 Filed at: 01/21/2022 0123   HR >=100 0 Filed at: 01/21/2022 0123   O2 Sat on room air < 95% 0 Filed at: 01/21/2022 0123   History of PE or DVT 0 Filed at: 01/21/2022 8896   Recent trauma or surgery 0 Filed at: 01/21/2022 0123   Hemoptysis 0 Filed at: 01/21/2022 0123   Exogenous estrogen 0 Filed at: 01/21/2022 0123   Unilateral leg swelling 0 Filed at: 01/21/2022 0123   PERC Rule for PE Results 0 Filed at: 01/21/2022 0123              SBIRT 22yo+      Most Recent Value   SBIRT (23 yo +)    In order to provide better care to our patients, we are screening all of our patients for alcohol and drug use  Would it be okay to ask you these screening questions?  No Filed at: 01/21/2022 4282            Wells' Criteria for DVT      Most Recent Value   Wells' Criteria for DVT    Active cancer Treatment or palliation within 6 months 0 Filed at: 01/21/2022 9301   Bedridden recently >3 days or major surgery within 12 weeks 0 Filed at: 01/21/2022 0122   Calf swelling >3 cm compared to the other leg 0 Filed at: 01/21/2022 0122   Entire leg swollen 0 Filed at: 01/21/2022 0122   Collateral (nonvaricose) superficial veins present 0 Filed at: 01/21/2022 0122   Localized tenderness along the deep venous system 0 Filed at: 01/21/2022 0122   Pitting edema, confined to symptomatic leg 0 Filed at: 01/21/2022 0122   Paralysis, paresis, or recent plaster immobilization of the lower extremity 0 Filed at: 01/21/2022 0122   Previously documented DVT 0 Filed at: 01/21/2022 0122   Alternative diagnosis to DVT as likely or more likely 0 Filed at: 01/21/2022 0122   Gabe DVT Critera Score 0 Filed at: 01/21/2022 0122              MDM  Number of Diagnoses or Management Options  Nasal congestion: new and does not require workup  URI (upper respiratory infection): new and does not require workup     Amount and/or Complexity of Data Reviewed  Clinical lab tests: ordered and reviewed  Tests in the radiology section of CPT®: ordered and reviewed  Review and summarize past medical records: yes  Independent visualization of images, tracings, or specimens: yes    Risk of Complications, Morbidity, and/or Mortality  Presenting problems: moderate  Diagnostic procedures: moderate  Management options: low    Patient Progress  Patient progress: stable       Patient is a 69-year-old female with history of PCOS that presents emergency depart with intermittent dry nonproductive cough for 6 days  Patient has associated symptomatology of shortness of breath, dull aching nonradiating throat pain and chest tightness beginning the current presentation of cough  Patient hemodynamically stable afebrile  Patient denied offered pregnancy test  Wells negative, perc negative with PE not likely  Patient reports positive outpatient COVID test  Chest x-ray with no acute cardiopulmonary disease on initial read  ECG with normal sinus rhythm; and negative troponin with ACS doubt likely; normal electrolytes, normal glucose, normal kidney function  No leukocytosis, no banding  Patient hemodynamically stable and afebrile  Delivered of ureteral, Claritin, Afrin with patient verbalized decrease in chest tightness and coughing symptoms status post medication delivery  Patient with ambulatory pulse ox of 98% on room air and holding with no difficulty or incident    Prescribed Claritin, multivitamins, vitamin-D, vitamin-C, and Tylenol counseled patient medication administration and side effects  Consume plenty fluids and electrolytes  Obtain pulse oximeter and it is advised that you return to the hospital should SP O2 saturation dropped below 92%  Maintain quarantine precautions  Follow-up with PCP  Follow up emergency department symptoms persist or exacerbate  Patient verbal understanding all clinical laboratory imaging findings, discharge instructions, follow-up verbalized agreement current patient treatment plan  Disposition  Final diagnoses:   URI (upper respiratory infection)   Nasal congestion     Time reflects when diagnosis was documented in both MDM as applicable and the Disposition within this note     Time User Action Codes Description Comment    1/21/2022  2:50 AM Mally Busch [J06 9] URI (upper respiratory infection)     1/21/2022  2:51 AM Mally Busch [R09 81] Nasal congestion       ED Disposition     ED Disposition Condition Date/Time Comment    Discharge Stable Fri Jan 21, 2022  2:46 AM Del Johnson discharge to home/self care  Follow-up Information     Follow up With Specialties Details Why Contact Info Additional [address], [facility]  Internal Medicine, Nurse Practitioner   Red Harrison [address]  [address] [facility] Emergency Department Emergency Medicine   [address] [facility] [id number] [place] [address] Emergency Department, [address], [place], [place], [address]          Discharge Medication List as of 1/21/2022  2:52 AM      START taking these medications    Details   Ascorbic Acid (vitamin C) 1000 MG tablet Take 1 tablet (1,000 mg total) by mouth 2 (two) times a day for 14 days, Starting Fri 1/21/2022, Until Fri 2/4/2022, Normal      cholecalciferol (VITAMIN D3) 1,000 units tablet Take 2 tablets (2,000 Units total) by mouth daily for 14 days, Starting Fri 1/21/2022, Until Fri 2/4/2022, Normal      loratadine (CLARITIN) 10 mg tablet Take 1 tablet (10 mg total) by mouth daily for 3 days, Starting Fri 1/21/2022, Until Mon 1/24/2022, Normal      !! Multiple Vitamin (multivitamin) tablet Take 1 tablet by mouth daily for 14 days, Starting Fri 1/21/2022, Until Fri 2/4/2022, Normal       !! - Potential duplicate medications found   Please discuss with provider  CONTINUE these medications which have NOT CHANGED    Details   Inositol-D Chiro-Inositol (OVASITOL PO) Take by mouth, Historical Med      !! multivitamin (THERAGRAN) TABS Take 1 tablet by mouth daily, Historical Med       !! - Potential duplicate medications found  Please discuss with provider  No discharge procedures on file      PDMP Review     None          ED Provider  Electronically Signed by           Tamiko Menendez PA-C  01/21/22 2328

## 2022-01-26 NOTE — QUICK NOTE
Review of Systems   Constitutional: Negative for activity change, chills, diaphoresis and fever  HENT: Positive for congestion  Negative for sinus pressure and sore throat  Eyes: Negative for pain and visual disturbance  Respiratory: Positive for cough  Positive for chest tightness, shortness of breath, wheezing and stridor  Cardiovascular: Negative for chest pain and palpitations  Gastrointestinal: Negative for abdominal distention, abdominal pain, constipation, diarrhea, nausea and vomiting  Genitourinary: Negative for dysuria and frequency  Musculoskeletal: negative for myalgias  Negative for neck pain and neck stiffness  Skin: Negative for rash  Neurological: Negative for dizziness, speech difficulty, light-headedness, numbness and headaches

## 2022-09-15 PROCEDURE — 99283 EMERGENCY DEPT VISIT LOW MDM: CPT

## 2022-09-15 PROCEDURE — 0241U HB NFCT DS VIR RESP RNA 4 TRGT: CPT | Performed by: EMERGENCY MEDICINE

## 2022-09-16 ENCOUNTER — HOSPITAL ENCOUNTER (EMERGENCY)
Facility: HOSPITAL | Age: 32
Discharge: HOME | End: 2022-09-16
Attending: EMERGENCY MEDICINE
Payer: OTHER GOVERNMENT

## 2022-09-16 VITALS
SYSTOLIC BLOOD PRESSURE: 120 MMHG | DIASTOLIC BLOOD PRESSURE: 56 MMHG | RESPIRATION RATE: 16 BRPM | OXYGEN SATURATION: 100 % | TEMPERATURE: 98.4 F | HEART RATE: 73 BPM

## 2022-09-16 DIAGNOSIS — J06.9 VIRAL UPPER RESPIRATORY TRACT INFECTION: Primary | ICD-10-CM

## 2022-09-16 LAB
FLUAV RNA RESP QL NAA+PROBE: NEGATIVE
FLUBV RNA RESP QL NAA+PROBE: NEGATIVE
RSV RNA RESP QL NAA+PROBE: NEGATIVE
SARS-COV-2 RNA RESP QL NAA+PROBE: NEGATIVE

## 2022-09-16 PROCEDURE — 99284 EMERGENCY DEPT VISIT MOD MDM: CPT

## 2022-09-16 RX ORDER — BENZONATATE 100 MG/1
100 CAPSULE ORAL EVERY 8 HOURS
Qty: 21 CAPSULE | Refills: 0 | Status: SHIPPED | OUTPATIENT
Start: 2022-09-16

## 2022-09-16 RX ORDER — BENZONATATE 100 MG/1
100 CAPSULE ORAL ONCE
Status: COMPLETED | OUTPATIENT
Start: 2022-09-16 | End: 2022-09-16

## 2022-09-16 RX ORDER — FLUTICASONE PROPIONATE 50 MCG
1 SPRAY, SUSPENSION (ML) NASAL ONCE
Status: COMPLETED | OUTPATIENT
Start: 2022-09-16 | End: 2022-09-16

## 2022-09-16 RX ORDER — FLUTICASONE PROPIONATE 50 MCG
1 SPRAY, SUSPENSION (ML) NASAL DAILY
Status: DISCONTINUED | OUTPATIENT
Start: 2022-09-16 | End: 2022-09-16

## 2022-09-16 RX ADMIN — BENZONATATE 100 MG: 100 CAPSULE ORAL at 01:33

## 2022-09-16 RX ADMIN — FLUTICASONE PROPIONATE 1 SPRAY: 50 SPRAY, METERED NASAL at 01:46

## 2022-09-16 NOTE — Clinical Note
William accompanied Bell August to the emergency department on 9/15/2022  Return date if applicable: 91/40/1223        If you have any questions or concerns, please don't hesitate to call        Yesika Simons

## 2022-09-16 NOTE — ED PROVIDER NOTES
History  Chief Complaint   Patient presents with    Nasal Congestion     Pt reports nasal congestion, cough, sore throat since Sunday  Patient is a 41-year-old female with no significant past medical history presenting for evaluation of 5 days of URI symptoms  Patient notes that her symptoms started this past Sunday, 5 days prior to arrival, with a mild sore throat  The following day she developed nasal congestion and her throat became more scratchy and more painful  The following day she developed dry, irritative cough and sensation of ear fullness  She notes that some of her family members have been sick with similar symptoms  She denies pain at this time  She denies fevers, chills, headache, vision changes, difficulty swallowing, chest pain, shortness of breath, abdominal pain, N/V/D, urinary complaints, and skin changes  She is not immunocompromised  She reports having COVID twice this year  History provided by:  Patient   used: No    URI  Presenting symptoms: congestion, cough and sore throat    Presenting symptoms: no ear pain (Ear fullness, intermittent diminished hearing that clears with opening her jaw), no facial pain, no fatigue, no fever and no rhinorrhea    Congestion:     Location:  Nasal    Interferes with sleep: yes      Interferes with eating/drinking: no    Cough:     Cough characteristics:  Dry    Severity:  Moderate    Onset quality:  Sudden    Duration:  3 days    Timing:  Intermittent    Progression:  Unchanged    Chronicity:  New  Sore throat:     Severity:  Moderate    Onset quality:  Gradual    Duration:  5 days    Timing:  Constant    Progression:  Unchanged  Severity:  Mild  Onset quality:  Gradual  Duration:  5 days  Timing:  Constant  Progression:  Worsening  Chronicity:  New  Relieved by:  Rest and hot fluids  Worsened by:  Eating  Ineffective treatments: Mucinex    Associated symptoms: sneezing    Associated symptoms: no arthralgias, no headaches, no myalgias, no neck pain, no sinus pain, no swollen glands and no wheezing    Risk factors: sick contacts    Risk factors: not elderly, no chronic cardiac disease, no chronic kidney disease, no chronic respiratory disease, no diabetes mellitus, no immunosuppression, no recent illness and no recent travel        Prior to Admission Medications   Prescriptions Last Dose Informant Patient Reported? Taking? Ascorbic Acid (vitamin C) 1000 MG tablet   No No   Sig: Take 1 tablet (1,000 mg total) by mouth 2 (two) times a day for 14 days   Inositol-D Chiro-Inositol (OVASITOL PO)  Self Yes No   Sig: Take by mouth   Multiple Vitamin (multivitamin) tablet   No No   Sig: Take 1 tablet by mouth daily for 14 days   cholecalciferol (VITAMIN D3) 1,000 units tablet   No No   Sig: Take 2 tablets (2,000 Units total) by mouth daily for 14 days   loratadine (CLARITIN) 10 mg tablet   No No   Sig: Take 1 tablet (10 mg total) by mouth daily for 3 days   multivitamin (THERAGRAN) TABS   Yes No   Sig: Take 1 tablet by mouth daily      Facility-Administered Medications: None       Past Medical History:   Diagnosis Date    Heart murmur 2010    PCOS (polycystic ovarian syndrome)     Seasonal allergies        Past Surgical History:   Procedure Laterality Date    CERVICAL CERCLAGE  2009    FOOT SURGERY Right        Family History   Problem Relation Age of Onset    Asthma Mother     Arthritis Mother     Asthma Sister     Diabetes Maternal Grandmother     Osteoporosis Maternal Grandmother     Arthritis Maternal Grandmother     Hypertension Paternal Grandmother      I have reviewed and agree with the history as documented      E-Cigarette/Vaping    E-Cigarette Use Former User     Comments quit 2016      E-Cigarette/Vaping Substances    Nicotine No     THC No     CBD No     Flavoring No     Other No     Unknown No      Social History     Tobacco Use    Smoking status: Former Smoker    Smokeless tobacco: Never Used   Vaping Use  Vaping Use: Former   Substance Use Topics    Alcohol use: Yes     Comment: social    Drug use: Never       Review of Systems   Constitutional: Negative for chills, fatigue and fever  HENT: Positive for congestion, sneezing, sore throat and voice change (Nasally" voice)  Negative for dental problem, drooling, ear discharge, ear pain (Ear fullness, intermittent diminished hearing that clears with opening her jaw), rhinorrhea, sinus pain and trouble swallowing  Eyes: Negative for pain and visual disturbance  Respiratory: Positive for cough  Negative for chest tightness, shortness of breath and wheezing  Cardiovascular: Negative for chest pain, palpitations and leg swelling  Gastrointestinal: Negative for abdominal pain, diarrhea, nausea and vomiting  Genitourinary: Negative for decreased urine volume, difficulty urinating, flank pain, frequency and hematuria  Musculoskeletal: Negative for arthralgias, back pain, gait problem, joint swelling, myalgias, neck pain and neck stiffness  Skin: Negative for color change, rash and wound  Allergic/Immunologic: Negative for immunocompromised state  Neurological: Negative for dizziness, seizures, syncope, light-headedness, numbness and headaches  All other systems reviewed and are negative  Physical Exam  Physical Exam  Vitals and nursing note reviewed  Constitutional:       General: She is awake  She is not in acute distress  Appearance: Normal appearance  She is well-developed and normal weight  She is not ill-appearing, toxic-appearing or diaphoretic  HENT:      Head: Normocephalic and atraumatic  Right Ear: Tympanic membrane normal       Left Ear: Tympanic membrane normal       Nose: Congestion present  No rhinorrhea  Right Turbinates: Enlarged and swollen  Left Turbinates: Enlarged and swollen  Mouth/Throat:      Lips: Pink  No lesions  Mouth: Mucous membranes are moist       Dentition: Normal dentition  Pharynx: Oropharynx is clear  Uvula midline  Posterior oropharyngeal erythema present  No pharyngeal swelling, oropharyngeal exudate or uvula swelling  Tonsils: No tonsillar exudate or tonsillar abscesses  1+ on the right  1+ on the left  Eyes:      General: Lids are normal  Vision grossly intact  Gaze aligned appropriately  Extraocular Movements: Extraocular movements intact  Conjunctiva/sclera: Conjunctivae normal       Pupils: Pupils are equal, round, and reactive to light  Neck:      Trachea: Trachea and phonation normal  No abnormal tracheal secretions  Cardiovascular:      Rate and Rhythm: Normal rate  Pulses: Normal pulses  Radial pulses are 2+ on the right side and 2+ on the left side  Dorsalis pedis pulses are 2+ on the right side and 2+ on the left side  Posterior tibial pulses are 2+ on the right side and 2+ on the left side  Heart sounds: Normal heart sounds, S1 normal and S2 normal  No murmur heard  Pulmonary:      Effort: Pulmonary effort is normal  No tachypnea or respiratory distress  Breath sounds: Normal breath sounds and air entry  No stridor, decreased air movement or transmitted upper airway sounds  No decreased breath sounds, wheezing, rhonchi or rales  Abdominal:      Palpations: Abdomen is soft  Tenderness: There is no abdominal tenderness  There is no guarding or rebound  Musculoskeletal:         General: Normal range of motion  Cervical back: Normal range of motion and neck supple  Right lower leg: No edema  Left lower leg: No edema  Comments: TSE, 5/5 strength throughout, sensation intact, no focal joint swelling  Ambulatory with steady gait  Lymphadenopathy:      Cervical: No cervical adenopathy  Skin:     General: Skin is warm and dry  Capillary Refill: Capillary refill takes less than 2 seconds  Findings: No rash or wound  Neurological:      General: No focal deficit present  Mental Status: She is alert and oriented to person, place, and time  Mental status is at baseline  GCS: GCS eye subscore is 4  GCS verbal subscore is 5  GCS motor subscore is 6  Psychiatric:         Behavior: Behavior is cooperative  Vital Signs  ED Triage Vitals   Temperature Pulse Respirations Blood Pressure SpO2   09/15/22 2312 09/15/22 2312 09/15/22 2312 09/15/22 2312 09/15/22 2312   98 4 °F (36 9 °C) 77 18 139/84 99 %      Temp Source Heart Rate Source Patient Position - Orthostatic VS BP Location FiO2 (%)   09/15/22 2312 09/16/22 0145 09/16/22 0145 09/16/22 0145 --   Oral Monitor Sitting Right arm       Pain Score       09/16/22 0145       No Pain           Vitals:    09/15/22 2312 09/16/22 0145   BP: 139/84 120/56   Pulse: 77 73   Patient Position - Orthostatic VS:  Sitting         Visual Acuity      ED Medications  Medications   benzonatate (TESSALON PERLES) capsule 100 mg (100 mg Oral Given 9/16/22 0133)   fluticasone (FLONASE) 50 mcg/act nasal spray 1 spray (1 spray Each Nare Given 9/16/22 0146)       Diagnostic Studies  Results Reviewed     Procedure Component Value Units Date/Time    FLU/RSV/COVID - if FLU/RSV clinically relevant [[id number]]  (Normal) Collected: 09/15/22 2326    Lab Status: Final result Specimen: Nares from Nose Updated: 09/16/22 0013     SARS-CoV-2 Negative     INFLUENZA A PCR Negative     INFLUENZA B PCR Negative     RSV PCR Negative    Narrative:      FOR PEDIATRIC PATIENTS - copy/paste COVID Guidelines URL to browser: [email or web address]    SARS-CoV-2 assay is a Nucleic Acid Amplification assay intended for the  qualitative detection of nucleic acid from SARS-CoV-2 in nasopharyngeal  swabs  Results are for the presumptive identification of SARS-CoV-2 RNA      Positive results are indicative of infection with SARS-CoV-2, the virus  causing COVID-19, but do not rule out bacterial infection or co-infection  with other viruses  Laboratories within the [place] and its  territories are required to report all positive results to the appropriate  public health authorities  Negative results do not preclude SARS-CoV-2  infection and should not be used as the sole basis for treatment or other  patient management decisions  Negative results must be combined with  clinical observations, patient history, and epidemiological information  This test has not been FDA cleared or approved  This test has been authorized by FDA under an Emergency Use Authorization  (EUA)  This test is only authorized for the duration of time the  declaration that circumstances exist justifying the authorization of the  emergency use of an in vitro diagnostic tests for detection of SARS-CoV-2  virus and/or diagnosis of COVID-19 infection under section 564(b)(1) of  the Act, 21 U  S C  650AZN-0(N)(7), unless the authorization is terminated  or revoked sooner  The test has been validated but independent review by FDA  and CLIA is pending  Test performed using [employer] Gene[employer]: This RT-PCR assay targets N2,  a region unique to SARS-CoV-2  A conserved region in the E-gene was chosen  for pan-Sarbecovirus detection which includes SARS-CoV-2  No orders to display              Procedures  Procedures         ED Course  ED Course as of 09/16/22 0234   Fri Sep 16, 2022   6823 FLU/RSV/COVID - if FLU/RSV clinically relevant  Negative   0044 Stable vital signs                                               MDM  Number of Diagnoses or Management Options  Viral upper respiratory tract infection: new and does not require workup  Diagnosis management comments: URI sx: COVID/flu/RSV; no red flags, no complaint of pain, PE benign       Amount and/or Complexity of Data Reviewed  Clinical lab tests: ordered and reviewed  Decide to obtain previous medical records or to obtain history from someone other than the patient: yes  Review and summarize past medical records: yes    Risk of Complications, Morbidity, and/or Mortality  General comments: Assessment:  Patient is a 78-year-old female with no significant past medical history presenting for evaluation of 5 days of progressive URI symptoms  Her vital signs are stable  She has no complaint of pain and physical exam is without acute finding  Plan made with patient to treat symptomatically with Flonase nasal spray for nasal congestion and Tessalon Perle for dry, irritative cough  Patient offered chest x-ray, but declines given likelihood of viral source of her symptoms  Plan made with patient for discharge home with supportive care  DC paperwork reviewed with emphasis on strict return precautions and new prescriptions  Patient and her caregiver have no questions at this time  Repeat vital signs stable  Patient ambulatory with steady gait, in no acute distress, with no complaint of pain, at time of discharge    Patient Progress  Patient progress: improved      Disposition  Final diagnoses:   Viral upper respiratory tract infection     Time reflects when diagnosis was documented in both MDM as applicable and the Disposition within this note     Time User Action Codes Description Comment    9/16/2022  1:36 AM Pat Busch [J06 9] Viral upper respiratory tract infection       ED Disposition     ED Disposition   Discharge    Condition   Stable    Date/Time   Fri Sep 16, 2022  1:36 AM    Comment   Gerson Barron discharge to home/self care  Follow-up Information     Follow up With Specialties Details Why Contact Info Additional [address], [address] [facility], Nurse Practitioner Schedule an appointment as soon as possible for a visit in 1 week  [address]    [address] [facility] Emergency Medicine Go to  If symptoms worsen [address] [facility] [address] [phone]   [address] [facility] Emergency Department, [address], [facility] [place], [address]          Discharge Medication List as of 9/16/2022  1:41 AM      START taking these medications    Details   benzonatate (TESSALON PERLES) 100 mg capsule Take 1 capsule (100 mg total) by mouth every 8 (eight) hours, Starting Fri 9/16/2022, Normal      sodium chloride (OCEAN) 0 65 % nasal spray 1 spray into each nostril as needed for congestion, Starting Fri 9/16/2022, Normal         CONTINUE these medications which have NOT CHANGED    Details   Ascorbic Acid (vitamin C) 1000 MG tablet Take 1 tablet (1,000 mg total) by mouth 2 (two) times a day for 14 days, Starting Fri 1/21/2022, Until Fri 2/4/2022, Normal      cholecalciferol (VITAMIN D3) 1,000 units tablet Take 2 tablets (2,000 Units total) by mouth daily for 14 days, Starting Fri 1/21/2022, Until Fri 2/4/2022, Normal      Inositol-D Chiro-Inositol (OVASITOL PO) Take by mouth, Historical Med      loratadine (CLARITIN) 10 mg tablet Take 1 tablet (10 mg total) by mouth daily for 3 days, Starting Fri 1/21/2022, Until Mon 1/24/2022, Normal      multivitamin (THERAGRAN) TABS Take 1 tablet by mouth daily, Historical Med             No discharge procedures on file      PDMP Review     None          ED Provider  Electronically Signed by           Indiana Farfan  09/16/22 0231

## 2022-09-16 NOTE — Clinical Note
Nola Gilmore was seen and treated in our emergency department on 9/15/2022  Diagnosis:     Rebeca Briones  is off the rest of the shift today, may return to school on return date  She may return on this date: 09/19/2022         If you have any questions or concerns, please don't hesitate to call        Berna Alba    ______________________________           _______________          _______________  Hospital Representative                              Date                                Time

## 2022-09-16 NOTE — DISCHARGE INSTRUCTIONS
Notify your primary care provider of your visit today and schedule appointment next week for re-evaluation  Take the prescribed nasal saline sprays to treat your nasal congestion  Continue using your lozenges to assist her sore throat and promote drainage of the fluid behind her ears  Use the Tessalon Perles as needed for dry, irritative cough  Return to the Emergency Department sooner if increased pain, fever, vomiting, lethargy, blurry vision, dizziness, weakness, chest pain, difficulty walking or breathing, rash

## 2023-02-17 ENCOUNTER — HOSPITAL ENCOUNTER (EMERGENCY)
Facility: HOSPITAL | Age: 33
Discharge: HOME | End: 2023-02-17
Attending: EMERGENCY MEDICINE

## 2023-02-17 VITALS
DIASTOLIC BLOOD PRESSURE: 65 MMHG | BODY MASS INDEX: 37.72 KG/M2 | RESPIRATION RATE: 18 BRPM | SYSTOLIC BLOOD PRESSURE: 129 MMHG | WEIGHT: 230.16 LBS | TEMPERATURE: 98.4 F | OXYGEN SATURATION: 98 % | HEART RATE: 61 BPM

## 2023-02-17 DIAGNOSIS — S30.814A ABRASION OF LABIA, INITIAL ENCOUNTER: Primary | ICD-10-CM

## 2023-02-17 NOTE — Clinical Note
Del Johnson was seen and treated in our emergency department on 2/17/2023  Diagnosis:     Sage Barron  is off the rest of the shift today, may return to work on return date  She may return on this date: 02/19/2023         If you have any questions or concerns, please don't hesitate to call        Tamiko Nunez    ______________________________           _______________          _______________  Hospital Representative                              Date                                Time

## 2023-02-18 NOTE — ED PROVIDER NOTES
History  Chief Complaint   Patient presents with   • Vaginal Bleeding     Pt presents c/o vaginal bleeding that started today while she was in the shower  Pt states she started taking spironolactone  Patient is a 78-year-old female with PCOS presenting for evaluation of possible abrasion/laceration to her labia  Patient notes that she was in the shower this evening and shaving when she looked down and saw blood flowing from her private parts down her right leg  She notes that she was shaving down there at that time but did not experience any pain so she was confused and originally thought she might of started her menstrual cycle  However, after getting out of the bathroom she continued to have blood drip from the right labia  She held pressure for approximately 15 minutes, however it continued to bleed  Her  and evaluated it and noted an bleeding a bleeding abrasion versus laceration  Given the persistence of bleeding despite pressure, they present for evaluation  The patient notes that she feels well and denies all other complaints  History provided by:  Patient and significant other   used: No        Prior to Admission Medications   Prescriptions Last Dose Informant Patient Reported? Taking?    Ascorbic Acid (vitamin C) 1000 MG tablet   No No   Sig: Take 1 tablet (1,000 mg total) by mouth 2 (two) times a day for 14 days   Inositol-D Chiro-Inositol (OVASITOL PO)   Yes No   Sig: Take by mouth   benzonatate (TESSALON PERLES) 100 mg capsule   No No   Sig: Take 1 capsule (100 mg total) by mouth every 8 (eight) hours   cholecalciferol (VITAMIN D3) 1,000 units tablet   No No   Sig: Take 2 tablets (2,000 Units total) by mouth daily for 14 days   loratadine (CLARITIN) 10 mg tablet   No No   Sig: Take 1 tablet (10 mg total) by mouth daily for 3 days   multivitamin (THERAGRAN) TABS   Yes No   Sig: Take 1 tablet by mouth daily   sodium chloride (OCEAN) 0 65 % nasal spray   No No   Sig: 1 spray into each nostril as needed for congestion      Facility-Administered Medications: None       Past Medical History:   Diagnosis Date   • Heart murmur 2010   • PCOS (polycystic ovarian syndrome)    • Seasonal allergies        Past Surgical History:   Procedure Laterality Date   • CERVICAL CERCLAGE  2009   • FOOT SURGERY Right        Family History   Problem Relation Age of Onset   • Asthma Mother    • Arthritis Mother    • Asthma Sister    • Diabetes Maternal Grandmother    • Osteoporosis Maternal Grandmother    • Arthritis Maternal Grandmother    • Hypertension Paternal Grandmother      I have reviewed and agree with the history as documented  E-Cigarette/Vaping   • E-Cigarette Use Former User    • Comments quit 2016      E-Cigarette/Vaping Substances   • Nicotine No    • THC No    • CBD No    • Flavoring No    • Other No    • Unknown No      Social History     Tobacco Use   • Smoking status: Former   • Smokeless tobacco: Never   Vaping Use   • Vaping Use: Former   Substance Use Topics   • Alcohol use: Yes     Comment: social   • Drug use: Never       Review of Systems   Constitutional: Negative for chills and fever  Cardiovascular: Negative for chest pain and palpitations  Gastrointestinal: Negative for abdominal pain, diarrhea, nausea and vomiting  Musculoskeletal: Negative for back pain and gait problem  Skin: Positive for wound  Negative for color change and pallor  Allergic/Immunologic: Negative for immunocompromised state  Neurological: Negative for dizziness, weakness, light-headedness and headaches  All other systems reviewed and are negative  Physical Exam  Physical Exam  Vitals and nursing note reviewed  Exam conducted with a chaperone present (pt's )  Constitutional:       General: She is not in acute distress  Appearance: Normal appearance  She is not ill-appearing, toxic-appearing or diaphoretic  HENT:      Head: Normocephalic and atraumatic        Nose: Nose normal       Mouth/Throat:      Lips: Pink  Mouth: Mucous membranes are moist    Eyes:      Extraocular Movements: Extraocular movements intact  Conjunctiva/sclera: Conjunctivae normal    Cardiovascular:      Rate and Rhythm: Normal rate  Pulses:           Radial pulses are 2+ on the right side and 2+ on the left side  Pulmonary:      Effort: Pulmonary effort is normal  No respiratory distress  Abdominal:      General: There is no distension  Palpations: Abdomen is soft  Tenderness: There is no abdominal tenderness  Genitourinary:     General: Normal vulva  Exam position: Lithotomy position  Pubic Area: No rash  Labia:         Right: Injury (small abrasion with slow continuous venous bleeding, controlled with light pressure) present  No rash, tenderness or lesion  Left: No rash, tenderness, lesion or injury  Urethra: No prolapse, urethral pain or urethral swelling  Comments: No surrounding erythema, warmth, swelling  No pain on palpation  Musculoskeletal:         General: Normal range of motion  Cervical back: Normal range of motion and neck supple  Skin:     General: Skin is warm and dry  Capillary Refill: Capillary refill takes less than 2 seconds  Findings: Wound (see  exam) present  No rash  Neurological:      General: No focal deficit present  Mental Status: She is alert and oriented to person, place, and time  Mental status is at baseline  GCS: GCS eye subscore is 4  GCS verbal subscore is 5  GCS motor subscore is 6           Vital Signs  ED Triage Vitals [02/17/23 2020]   Temperature Pulse Respirations Blood Pressure SpO2   98 4 °F (36 9 °C) 61 18 129/65 98 %      Temp Source Heart Rate Source Patient Position - Orthostatic VS BP Location FiO2 (%)   Oral Monitor Lying Right arm --      Pain Score       No Pain           Vitals:    02/17/23 2020   BP: 129/65   Pulse: 61   Patient Position - Orthostatic VS: Lying         Visual Acuity      ED Medications  Medications - No data to display    Diagnostic Studies  Results Reviewed     None                 No orders to display              Procedures  Laceration repair    Date/Time: 2/17/2023 10:05 PM  Performed by: Indiana Marquez  Authorized by: ANN Marquez   Consent: Verbal consent obtained  Risks and benefits: risks, benefits and alternatives were discussed  Consent given by: patient and spouse  Patient understanding: patient states understanding of the procedure being performed  Imaging studies available: NA   Patient identity confirmed: verbally with patient  Time out: Immediately prior to procedure a "time out" was called to verify the correct patient, procedure, equipment, support staff and site/side marked as required  Body area: anogenital  Location details: vulva  Laceration length: 0 1 cm  Tendon involvement: none  Nerve involvement: none  Vascular damage: no  Anesthesia method: NA     Sedation:  Patient sedated: no      Wound Dehiscence:  Superficial Wound Dehiscence: simple closure      Procedure Details:  Irrigation solution: saline  Irrigation method: syringe  Amount of cleaning: standard  Skin closure: glue  Approximation: close  Approximation difficulty: simple  Patient tolerance: patient tolerated the procedure well with no immediate complications  Cleaning details: other organic matter             ED Course  ED Course as of 02/18/23 0915   Fri Feb 17, 2023   2105 Triage vital signs stable                                             Medical Decision Making  DDx including but not limited to: Wound check, abrasion, laceration; doubt wound infection, wound dehiscence, cellulitis, abscess    Patient is a 81-year-old female presenting for evaluation of possible laceration/abrasion to the labia  Patient's vital signs are stable and she is well-appearing  She has no complaints other than a slow continuous bleed from her right labia    On exam, she has a very small break in skin to the right labia with slow continuous venous bleeding  It is well controlled with light pressure  Plan made for holding of light pressure and application of glue to prevent rebleeding  Patient agreeable to plan  Procedure performed without difficulty or complication  Plan for patient to return home and continue to monitor site for rebleeding and signs/symptoms of infection  Patient's  at bedside and agreeable to the plan  Patient continues to be well-appearing, with no other further complaints  DC paperwork reviewed with emphasis on follow-up with primary care for reevaluation and return to the ER if any new rebleeding or signs of infection  Patient is well-appearing, in no acute distress, nontoxic, and ambulatory with steady gait at time of discharge  Abrasion of labia, initial encounter: acute illness or injury      Disposition  Final diagnoses:   Abrasion of labia, initial encounter     Time reflects when diagnosis was documented in both MDM as applicable and the Disposition within this note     Time User Action Codes Description Comment    2/17/2023  9:48 PM Sacha Mendoza Add [G50 366J] Abrasion of labia, initial encounter       ED Disposition     ED Disposition   Discharge    Condition   Stable    Date/Time   Fri Feb 17, 2023  9:47 PM    Comment   Violetta Charles discharge to home/self care  Follow-up Information     Follow up With Specialties Details Why Contact Info Additional [address], [address] [facility]  Internal Medicine, Nurse Practitioner Schedule an appointment as soon as possible for a visit in 1 week  [address]    [address] [facility] Emergency Department Emergency Medicine Go to  If symptoms worsen [address] [facility] [address] [facility] Emergency Department, [address], [place], [place], [address]          Discharge Medication List as of 2/17/2023  9:49 PM      CONTINUE these medications which have NOT CHANGED    Details   Ascorbic Acid (vitamin C) 1000 MG tablet Take 1 tablet (1,000 mg total) by mouth 2 (two) times a day for 14 days, Starting Fri 1/21/2022, Until Fri 2/4/2022, Normal      benzonatate (TESSALON PERLES) 100 mg capsule Take 1 capsule (100 mg total) by mouth every 8 (eight) hours, Starting Fri 9/16/2022, Normal      cholecalciferol (VITAMIN D3) 1,000 units tablet Take 2 tablets (2,000 Units total) by mouth daily for 14 days, Starting Fri 1/21/2022, Until Fri 2/4/2022, Normal      Inositol-D Chiro-Inositol (OVASITOL PO) Take by mouth, Historical Med      loratadine (CLARITIN) 10 mg tablet Take 1 tablet (10 mg total) by mouth daily for 3 days, Starting Fri 1/21/2022, Until Mon 1/24/2022, Normal      multivitamin (THERAGRAN) TABS Take 1 tablet by mouth daily, Historical Med      sodium chloride (OCEAN) 0 65 % nasal spray 1 spray into each nostril as needed for congestion, Starting Fri 9/16/2022, Normal             No discharge procedures on file      PDMP Review     None          ED Provider  Electronically Signed by           Indiana Galloway  02/18/23 3112

## 2023-02-18 NOTE — DISCHARGE INSTRUCTIONS
As discussed please keep the glued wound dry next 2 days  Appointment reevaluation in the next week with your primary care provider  Her  Nehemiah Guidry monitor for signs of evolving infection including redness/swelling/warmth/ripping/puslike discharge  Return to the ER develop persistent fever, worsening pain, signs of infection, new bleeding, weakness, or lethargy

## 2023-03-03 ENCOUNTER — TELEPHONE (OUTPATIENT)
Dept: INTERNAL MEDICINE CLINIC | Facility: CLINIC | Age: 33
End: 2023-03-03

## 2023-03-03 NOTE — TELEPHONE ENCOUNTER
Patient has been seeing the [place] for all her primary care needs  Records in epic      Please remove as PCP

## 2023-08-11 ENCOUNTER — HOSPITAL ENCOUNTER (EMERGENCY)
Facility: HOSPITAL | Age: 33
Discharge: HOME | End: 2023-08-11
Attending: EMERGENCY MEDICINE
Payer: OTHER GOVERNMENT

## 2023-08-11 VITALS
OXYGEN SATURATION: 98 % | TEMPERATURE: 98 F | HEART RATE: 74 BPM | SYSTOLIC BLOOD PRESSURE: 118 MMHG | DIASTOLIC BLOOD PRESSURE: 79 MMHG | RESPIRATION RATE: 18 BRPM

## 2023-08-11 DIAGNOSIS — J06.9 URI (UPPER RESPIRATORY INFECTION): Primary | ICD-10-CM

## 2023-08-11 PROCEDURE — 99284 EMERGENCY DEPT VISIT MOD MDM: CPT | Performed by: EMERGENCY MEDICINE

## 2023-08-11 PROCEDURE — 0241U HB NFCT DS VIR RESP RNA 4 TRGT: CPT | Performed by: EMERGENCY MEDICINE

## 2023-08-11 PROCEDURE — 99283 EMERGENCY DEPT VISIT LOW MDM: CPT

## 2023-08-12 NOTE — ED PROVIDER NOTES
History  Chief Complaint   Patient presents with   • COVID-19 Exposure     Pt reports runny nose.  tested positive for covid. Denies any other symptoms. HPI    Prior to Admission Medications   Prescriptions Last Dose Informant Patient Reported? Taking? Ascorbic Acid (vitamin C) 1000 MG tablet   No No   Sig: Take 1 tablet (1,000 mg total) by mouth 2 (two) times a day for 14 days   Inositol-D Chiro-Inositol (OVASITOL PO)  Self Yes No   Sig: Take by mouth   benzonatate (TESSALON PERLES) 100 mg capsule   No No   Sig: Take 1 capsule (100 mg total) by mouth every 8 (eight) hours   cholecalciferol (VITAMIN D3) 1,000 units tablet   No No   Sig: Take 2 tablets (2,000 Units total) by mouth daily for 14 days   loratadine (CLARITIN) 10 mg tablet   No No   Sig: Take 1 tablet (10 mg total) by mouth daily for 3 days   multivitamin (THERAGRAN) TABS   Yes No   Sig: Take 1 tablet by mouth daily   sodium chloride (OCEAN) 0.65 % nasal spray   No No   Si spray into each nostril as needed for congestion      Facility-Administered Medications: None       Past Medical History:   Diagnosis Date   • Heart murmur    • PCOS (polycystic ovarian syndrome)    • Seasonal allergies        Past Surgical History:   Procedure Laterality Date   • CERVICAL CERCLAGE     • FOOT SURGERY Right        Family History   Problem Relation Age of Onset   • Asthma Mother    • Arthritis Mother    • Asthma Sister    • Diabetes Maternal Grandmother    • Osteoporosis Maternal Grandmother    • Arthritis Maternal Grandmother    • Hypertension Paternal Grandmother      I have reviewed and agree with the history as documented.     E-Cigarette/Vaping   • E-Cigarette Use Former User    • Comments quit 2016      E-Cigarette/Vaping Substances   • Nicotine No    • THC No    • CBD No    • Flavoring No    • Other No    • Unknown No      Social History     Tobacco Use   • Smoking status: Former   • Smokeless tobacco: Never   Vaping Use   • Vaping Use: Former   Substance Use Topics   • Alcohol use: Yes     Comment: social   • Drug use: Never       Review of Systems    Physical Exam  Physical Exam    Vital Signs  ED Triage Vitals [08/11/23 2106]   Temperature Pulse Respirations Blood Pressure SpO2   98 °F (36.7 °C) 74 18 118/79 98 %      Temp Source Heart Rate Source Patient Position - Orthostatic VS BP Location FiO2 (%)   Oral Monitor Sitting Right arm --      Pain Score       --           Vitals:    08/11/23 2106   BP: 118/79   Pulse: 74   Patient Position - Orthostatic VS: Sitting         Visual Acuity      ED Medications  Medications - No data to display    Diagnostic Studies  Results Reviewed     Procedure Component Value Units Date/Time    FLU/RSV/COVID - if FLU/RSV clinically relevant [[id number]] Collected: 08/11/23 2156    Lab Status: In process Specimen: Nares from Nose Updated: 08/11/23 2208                 No orders to display              Procedures  Procedures         ED Course                                             MDM    Disposition  Final diagnoses:   None     ED Disposition     None      Follow-up Information    None         Patient's Medications   Discharge Prescriptions    No medications on file       No discharge procedures on file.     PDMP Review     None          ED Provider  Electronically Signed by Score       --           Vitals:    08/11/23 2106   BP: 118/79   Pulse: 74   Patient Position - Orthostatic VS: Sitting         Visual Acuity      ED Medications  Medications - No data to display    Diagnostic Studies  Results Reviewed     Procedure Component Value Units Date/Time    FLU/RSV/COVID - if FLU/RSV clinically relevant [[id number]]  (Normal) Collected: 08/11/23 2156    Lab Status: Final result Specimen: Nares from Nose Updated: 08/11/23 2306     SARS-CoV-2 Negative     INFLUENZA A PCR Negative     INFLUENZA B PCR Negative     RSV PCR Negative    Narrative:      FOR PEDIATRIC PATIENTS - copy/paste COVID Guidelines URL to browser: [email or web address]    SARS-CoV-2 assay is a Nucleic Acid Amplification assay intended for the  qualitative detection of nucleic acid from SARS-CoV-2 in nasopharyngeal  swabs. Results are for the presumptive identification of SARS-CoV-2 RNA. Positive results are indicative of infection with SARS-CoV-2, the virus  causing COVID-19, but do not rule out bacterial infection or co-infection  with other viruses. Laboratories within the [facility] and its  territories are required to report all positive results to the appropriate  public health authorities. Negative results do not preclude SARS-CoV-2  infection and should not be used as the sole basis for treatment or other  patient management decisions. Negative results must be combined with  clinical observations, patient history, and epidemiological information. This test has not been FDA cleared or approved. This test has been authorized by FDA under an Emergency Use Authorization  (EUA).  This test is only authorized for the duration of time the  declaration that circumstances exist justifying the authorization of the  emergency use of an in vitro diagnostic tests for detection of SARS-CoV-2  virus and/or diagnosis of COVID-19 infection under section 564(b)(1) of  the Act, 21 U. S.C. 100XQJ-0(X)(3), unless the authorization is terminated  or revoked sooner. The test has been validated but independent review by FDA  and CLIA is pending. Test performed using [employer] Gene[employer]pert: This RT-PCR assay targets N2,  a region unique to SARS-CoV-2. A conserved region in the E-gene was chosen  for pan-Sarbecovirus detection which includes SARS-CoV-2. According to CMS-2020-01-R, this platform meets the definition of high-throughput technology. No orders to display              Procedures  Procedures         ED Course                                             Medical Decision Making  Asymptomatic patient here for possible covid exposure and desire to start paxlovid should she test positive. Will send off pcr. Disposition  Final diagnoses:   URI (upper respiratory infection)     Time reflects when diagnosis was documented in both MDM as applicable and the Disposition within this note     Time User Action Codes Description Comment    8/11/2023 10:16 PM Ayala MORENO Add [J06.9] URI (upper respiratory infection)       ED Disposition     ED Disposition   Discharge    Condition   Stable    Date/Time   Fri Aug 11, 2023 10:16 PM    Comment   Ottoniel Gar discharge to home/self care.                Follow-up Information    None         Discharge Medication List as of 8/11/2023 10:16 PM      CONTINUE these medications which have NOT CHANGED    Details   Ascorbic Acid (vitamin C) 1000 MG tablet Take 1 tablet (1,000 mg total) by mouth 2 (two) times a day for 14 days, Starting Fri 1/21/2022, Until Fri 2/4/2022, Normal      benzonatate (TESSALON PERLES) 100 mg capsule Take 1 capsule (100 mg total) by mouth every 8 (eight) hours, Starting Fri 9/16/2022, Normal      cholecalciferol (VITAMIN D3) 1,000 units tablet Take 2 tablets (2,000 Units total) by mouth daily for 14 days, Starting Fri 1/21/2022, Until Fri 2/4/2022, Normal      Inositol-D Chiro-Inositol (OVASITOL PO) Take by mouth, Historical Med      loratadine (CLARITIN) 10 mg tablet Take 1 tablet (10 mg total) by mouth daily for 3 days, Starting Fri 1/21/2022, Until Mon 1/24/2022, Normal      multivitamin (THERAGRAN) TABS Take 1 tablet by mouth daily, Historical Med      sodium chloride (OCEAN) 0.65 % nasal spray 1 spray into each nostril as needed for congestion, Starting Fri 9/16/2022, Normal             No discharge procedures on file.     PDMP Review     None          ED Provider  Electronically Signed by           Juan Tee MD  08/15/23 8126

## 2024-01-02 ENCOUNTER — HOSPITAL ENCOUNTER (EMERGENCY)
Facility: HOSPITAL | Age: 34
Discharge: HOME | End: 2024-01-02
Attending: EMERGENCY MEDICINE
Payer: OTHER GOVERNMENT

## 2024-01-02 VITALS
DIASTOLIC BLOOD PRESSURE: 77 MMHG | RESPIRATION RATE: 18 BRPM | TEMPERATURE: 98.3 F | OXYGEN SATURATION: 99 % | SYSTOLIC BLOOD PRESSURE: 138 MMHG | HEART RATE: 84 BPM

## 2024-01-02 DIAGNOSIS — J06.9 VIRAL URI WITH COUGH: Primary | ICD-10-CM

## 2024-01-02 PROCEDURE — 0241U HB NFCT DS VIR RESP RNA 4 TRGT: CPT

## 2024-01-02 PROCEDURE — 99282 EMERGENCY DEPT VISIT SF MDM: CPT

## 2024-01-02 PROCEDURE — 99284 EMERGENCY DEPT VISIT MOD MDM: CPT

## 2024-01-02 RX ORDER — BENZONATATE 200 MG/1
200 CAPSULE ORAL 3 TIMES DAILY PRN
Qty: 15 CAPSULE | Refills: 0 | Status: SHIPPED | OUTPATIENT
Start: 2024-01-02 | End: 2024-01-07

## 2024-01-02 RX ORDER — GUAIFENESIN 600 MG/1
600 TABLET, EXTENDED RELEASE ORAL ONCE
Status: COMPLETED | OUTPATIENT
Start: 2024-01-02 | End: 2024-01-02

## 2024-01-02 RX ORDER — IBUPROFEN 600 MG/1
600 TABLET ORAL EVERY 6 HOURS PRN
Qty: 20 TABLET | Refills: 0 | Status: SHIPPED | OUTPATIENT
Start: 2024-01-02 | End: 2024-01-07

## 2024-01-02 RX ORDER — BENZONATATE 200 MG/1
200 CAPSULE ORAL 3 TIMES DAILY PRN
Qty: 15 CAPSULE | Refills: 0 | Status: SHIPPED | OUTPATIENT
Start: 2024-01-02 | End: 2024-01-02

## 2024-01-02 RX ORDER — FLUTICASONE PROPIONATE 50 MCG
1 SPRAY, SUSPENSION (ML) NASAL 2 TIMES DAILY
Qty: 16 G | Refills: 0 | Status: SHIPPED | OUTPATIENT
Start: 2024-01-02 | End: 2024-01-09

## 2024-01-02 RX ORDER — FLUTICASONE PROPIONATE 50 MCG
1 SPRAY, SUSPENSION (ML) NASAL ONCE
Status: COMPLETED | OUTPATIENT
Start: 2024-01-02 | End: 2024-01-02

## 2024-01-02 RX ADMIN — GUAIFENESIN 600 MG: 600 TABLET ORAL at 06:21

## 2024-01-02 RX ADMIN — FLUTICASONE PROPIONATE 1 SPRAY: 50 SPRAY, METERED NASAL at 06:21

## 2024-01-02 NOTE — DISCHARGE INSTRUCTIONS
Use prescribed fluticasone (Flonase), 1 spray per nostril twice daily, for the next 7 days to treat your ear pressure and nasal congestion. Take 650 mg of acetaminophen (Tylenol) every 6 hours as needed for mild or moderate pain.  Use the prescribed 600 mg ibuprofen (Motrin) every 6 hours as needed for mild to moderate pain.  I recommend alternating these every 3 hours for best fever and pain control.  Increase your fluids to maintain your hydration.  Use comfort measures of warm liquids, salt water gargles, and honey to soothe your sore throat.  Use the prescribed benzonatate (Tessalon) as needed for dry irritative cough.  If using over-the-counter respiratory infection medication such as DayQuil, NyQuil, or cough and cold relief, avoid use of Tylenol as many of these products have Tylenol added into them.    Return to the ER if you develop persistent fevers, severe headache, neck stiffness, inability tolerate fluids, difficulty swallowing, difficulty breathing, weakness, confusion, or lethargy.

## 2024-01-02 NOTE — ED PROVIDER NOTES
History  Chief Complaint   Patient presents with    Sore Throat     Sore throat since the , nasal/sinus congestion since the . Cough started last night. Denies fever     The patient is a 33-year-old female with no significant PMH presenting for evaluation of 6 days of nasal congestion, sore throat, ear fullness, and dry cough.  Patient started on the  (6 days PTA) with a sore scratchy throat.  She notes the following day developing nasal congestion, bilateral ear fullness, and dry cough.  She denies associated fevers or chills.  She notes intermittent headaches which respond to Tylenol.  She denies CP/SOB, palpitations, abdominal pain, N/V/D, urinary complaints, skin changes.  She notes her  has similar symptoms.      History provided by:  Patient   used: No        Prior to Admission Medications   Prescriptions Last Dose Informant Patient Reported? Taking?   Ascorbic Acid (vitamin C) 1000 MG tablet   No No   Sig: Take 1 tablet (1,000 mg total) by mouth 2 (two) times a day for 14 days   Inositol-D Chiro-Inositol (OVASITOL PO)  Self Yes No   Sig: Take by mouth   benzonatate (TESSALON PERLES) 100 mg capsule   No No   Sig: Take 1 capsule (100 mg total) by mouth every 8 (eight) hours   cholecalciferol (VITAMIN D3) 1,000 units tablet   No No   Sig: Take 2 tablets (2,000 Units total) by mouth daily for 14 days   loratadine (CLARITIN) 10 mg tablet   No No   Sig: Take 1 tablet (10 mg total) by mouth daily for 3 days   multivitamin (THERAGRAN) TABS   Yes No   Sig: Take 1 tablet by mouth daily   sodium chloride (OCEAN) 0.65 % nasal spray   No No   Si spray into each nostril as needed for congestion      Facility-Administered Medications: None       Past Medical History:   Diagnosis Date    Heart murmur     PCOS (polycystic ovarian syndrome)     Seasonal allergies        Past Surgical History:   Procedure Laterality Date    CERVICAL CERCLAGE  2009    FOOT SURGERY Right         Family History   Problem Relation Age of Onset    Asthma Mother     Arthritis Mother     Asthma Sister     Diabetes Maternal Grandmother     Osteoporosis Maternal Grandmother     Arthritis Maternal Grandmother     Hypertension Paternal Grandmother      I have reviewed and agree with the history as documented.    E-Cigarette/Vaping    E-Cigarette Use Former User     Comments quit 2016      E-Cigarette/Vaping Substances    Nicotine No     THC No     CBD No     Flavoring No     Other No     Unknown No      Social History     Tobacco Use    Smoking status: Former    Smokeless tobacco: Never   Vaping Use    Vaping status: Former   Substance Use Topics    Alcohol use: Yes     Comment: social    Drug use: Never       Review of Systems   Constitutional:  Negative for appetite change, chills and fever.   HENT:  Positive for congestion, ear pain (Bilateral ear fullness), postnasal drip, rhinorrhea, sinus pressure, sneezing and sore throat. Negative for dental problem, drooling, ear discharge, facial swelling, nosebleeds, sinus pain, tinnitus, trouble swallowing and voice change.    Respiratory:  Positive for cough. Negative for shortness of breath, wheezing and stridor.    Cardiovascular:  Negative for chest pain, palpitations and leg swelling.   Gastrointestinal:  Negative for abdominal pain, diarrhea, nausea and vomiting.   Genitourinary: Negative.    Musculoskeletal:  Negative for back pain, gait problem, neck pain and neck stiffness.   Skin:  Negative for color change, pallor, rash and wound.   Allergic/Immunologic: Negative for immunocompromised state.   Neurological:  Positive for headaches. Negative for dizziness, tremors, seizures, syncope, speech difficulty, weakness, light-headedness and numbness.   All other systems reviewed and are negative.      Physical Exam  Physical Exam  Vitals and nursing note reviewed.   Constitutional:       General: She is not in acute distress.     Appearance: Normal appearance.  She is well-developed. She is not ill-appearing (Appears fatigued and mildly ill), toxic-appearing or diaphoretic.   HENT:      Head: Normocephalic and atraumatic.      Jaw: There is normal jaw occlusion.      Right Ear: Tenderness (Slight external canal erythema with dry flaky skin consistent with eczema, mildly tender) present. No drainage or swelling. No mastoid tenderness. Tympanic membrane is not erythematous or bulging.      Left Ear: Ear canal and external ear normal. No drainage, swelling or tenderness. A middle ear effusion is present. No mastoid tenderness. Tympanic membrane is not erythematous.      Nose: Congestion and rhinorrhea present. Rhinorrhea is clear.      Right Turbinates: Enlarged and swollen.      Left Turbinates: Enlarged and swollen.      Mouth/Throat:      Lips: Pink.      Mouth: Mucous membranes are moist.      Dentition: Normal dentition.      Tongue: No lesions.      Palate: No lesions.      Pharynx: Oropharynx is clear. Uvula midline. Posterior oropharyngeal erythema present. No pharyngeal swelling, oropharyngeal exudate or uvula swelling.      Tonsils: No tonsillar exudate or tonsillar abscesses. 1+ on the right. 1+ on the left.   Eyes:      General: Lids are normal. Vision grossly intact. Gaze aligned appropriately.      Extraocular Movements: Extraocular movements intact.      Right eye: No nystagmus.      Left eye: No nystagmus.      Conjunctiva/sclera: Conjunctivae normal.      Right eye: Right conjunctiva is not injected.      Left eye: Left conjunctiva is not injected.      Pupils: Pupils are equal, round, and reactive to light.   Neck:      Trachea: Phonation normal. No abnormal tracheal secretions.   Cardiovascular:      Rate and Rhythm: Normal rate and regular rhythm.      Pulses:           Radial pulses are 2+ on the right side and 2+ on the left side.      Heart sounds: Normal heart sounds, S1 normal and S2 normal.   Pulmonary:      Effort: Pulmonary effort is normal. No  tachypnea or respiratory distress.      Breath sounds: Normal breath sounds and air entry.   Musculoskeletal:         General: Normal range of motion.      Cervical back: Full passive range of motion without pain, normal range of motion and neck supple.   Lymphadenopathy:      Cervical: Cervical adenopathy present.      Right cervical: No superficial, deep or posterior cervical adenopathy.     Left cervical: Superficial cervical adenopathy present. No deep or posterior cervical adenopathy.   Skin:     General: Skin is warm and dry.      Capillary Refill: Capillary refill takes less than 2 seconds.      Findings: No rash or wound.   Neurological:      General: No focal deficit present.      Mental Status: She is alert and oriented to person, place, and time. Mental status is at baseline.      GCS: GCS eye subscore is 4. GCS verbal subscore is 5. GCS motor subscore is 6.         Vital Signs  ED Triage Vitals [01/02/24 0545]   Temperature Pulse Respirations Blood Pressure SpO2   98.3 °F (36.8 °C) 84 18 138/77 99 %      Temp Source Heart Rate Source Patient Position - Orthostatic VS BP Location FiO2 (%)   Oral Monitor Sitting Right arm --      Pain Score       --           Vitals:    01/02/24 0545   BP: 138/77   Pulse: 84   Patient Position - Orthostatic VS: Sitting         Visual Acuity      ED Medications  Medications   fluticasone (FLONASE) 50 mcg/act nasal spray 1 spray (1 spray Each Nare Given 1/2/24 0621)   guaiFENesin (MUCINEX) 12 hr tablet 600 mg (600 mg Oral Given 1/2/24 0621)       Diagnostic Studies  Results Reviewed       Procedure Component Value Units Date/Time    FLU/RSV/COVID - if FLU/RSV clinically relevant [[id number]]  (Normal) Collected: 01/02/24 0622    Lab Status: Final result Specimen: Nares from Nose Updated: 01/02/24 0713     SARS-CoV-2 Negative     INFLUENZA A PCR Negative     INFLUENZA B PCR Negative     RSV PCR Negative    Narrative:      FOR PEDIATRIC PATIENTS - copy/paste COVID Guidelines  URL to browser: [email or web address]    SARS-CoV-2 assay is a Nucleic Acid Amplification assay intended for the  qualitative detection of nucleic acid from SARS-CoV-2 in nasopharyngeal  swabs. Results are for the presumptive identification of SARS-CoV-2 RNA.    Positive results are indicative of infection with SARS-CoV-2, the virus  causing COVID-19, but do not rule out bacterial infection or co-infection  with other viruses. Laboratories within the [place] and its  territories are required to report all positive results to the appropriate  public health authorities. Negative results do not preclude SARS-CoV-2  infection and should not be used as the sole basis for treatment or other  patient management decisions. Negative results must be combined with  clinical observations, patient history, and epidemiological information.  This test has not been FDA cleared or approved.    This test has been authorized by FDA under an Emergency Use Authorization  (EUA). This test is only authorized for the duration of time the  declaration that circumstances exist justifying the authorization of the  emergency use of an in vitro diagnostic tests for detection of SARS-CoV-2  virus and/or diagnosis of COVID-19 infection under section 564(b)(1) of  the Act, 21 U.S.C. 360bbb-3(b)(1), unless the authorization is terminated  or revoked sooner. The test has been validated but independent review by FDA  and CLIA is pending.    Test performed using [employer] GeneXpert: This RT-PCR assay targets N2,  a region unique to SARS-CoV-2. A conserved region in the E-gene was chosen  for pan-Sarbecovirus detection which includes SARS-CoV-2.    According to CMS-2020-01-R, this platform meets the definition of high-throughput technology.                   No orders to display              Procedures  Procedures         ED Course                               SBIRT 22yo+      Flowsheet Row Most  Recent Value   Initial Alcohol Screen: US AUDIT-C     1. How often do you have a drink containing alcohol? 0 Filed at: 01/02/2024 0555   2. How many drinks containing alcohol do you have on a typical day you are drinking?  0 Filed at: 01/02/2024 0555   3b. FEMALE Any Age, or MALE 65+: How often do you have 4 or more drinks on one occassion? 0 Filed at: 01/02/2024 0555   Audit-C Score 0 Filed at: 01/02/2024 0555   MAIN: How many times in the past year have you...    Used an illegal drug or used a prescription medication for non-medical reasons? Never Filed at: 01/02/2024 0555                      Medical Decision Making  DDx including but not limited to: Viral illness, URI; consider but doubt pneumonia or sinusitis     Patient is a pleasant 33-year-old female presenting for evaluation of 6 days of URI symptoms.  Physical exam is consistent with viral URI with intermittent dry cough.  Left middle ear effusion noted without evidence of acute otitis media.  Lung sounds clear and patient afebrile.  She denies fevers and chills.  She has multiple sick contacts from a party she was recently at.  Suspicion highest for viral illness.  Discussed supportive care.  She is in agreement with plan.  Will give first dose of medications here.  Strict return precautions reviewed and patient demonstrates understanding by teach back method.  She has no further questions at this time.        Problems Addressed:  Viral URI with cough: acute illness or injury    Risk  OTC drugs.  Prescription drug management.             Disposition  Final diagnoses:   Viral URI with cough     Time reflects when diagnosis was documented in both MDM as applicable and the Disposition within this note       Time User Action Codes Description Comment    1/2/2024  6:11 Randee Farfan Add [J06.9] Viral URI with cough           ED Disposition       ED Disposition   Discharge    Condition   Stable    Date/Time   Tue Jan 2, 2024 0611    Comment   Whitney Wyman  discharge to home/self care.                   Follow-up Information       Follow up With Specialties Details Why Contact Info    Your Primary Care Provider  Schedule an appointment as soon as possible for a visit in 1 week              Discharge Medication List as of 1/2/2024  6:17 AM        START taking these medications    Details   fluticasone (FLONASE) 50 mcg/act nasal spray 1 spray into each nostril 2 (two) times a day for 7 days, Starting Tue 1/2/2024, Until Tue 1/9/2024, Normal      ibuprofen (MOTRIN) 600 mg tablet Take 1 tablet (600 mg total) by mouth every 6 (six) hours as needed for mild pain or moderate pain for up to 5 days, Starting Tue 1/2/2024, Until Sun 1/7/2024 at 2359, Normal           CONTINUE these medications which have CHANGED    Details   !! benzonatate (TESSALON) 200 MG capsule Take 1 capsule (200 mg total) by mouth 3 (three) times a day as needed for cough for up to 5 days, Starting Tue 1/2/2024, Until Sun 1/7/2024 at 2359, Normal       !! - Potential duplicate medications found. Please discuss with provider.        CONTINUE these medications which have NOT CHANGED    Details   Ascorbic Acid (vitamin C) 1000 MG tablet Take 1 tablet (1,000 mg total) by mouth 2 (two) times a day for 14 days, Starting Fri 1/21/2022, Until Fri 2/4/2022, Normal      !! benzonatate (TESSALON PERLES) 100 mg capsule Take 1 capsule (100 mg total) by mouth every 8 (eight) hours, Starting Fri 9/16/2022, Normal      cholecalciferol (VITAMIN D3) 1,000 units tablet Take 2 tablets (2,000 Units total) by mouth daily for 14 days, Starting Fri 1/21/2022, Until Fri 2/4/2022, Normal      Inositol-D Chiro-Inositol (OVASITOL PO) Take by mouth, Historical Med      loratadine (CLARITIN) 10 mg tablet Take 1 tablet (10 mg total) by mouth daily for 3 days, Starting Fri 1/21/2022, Until Mon 1/24/2022, Normal      multivitamin (THERAGRAN) TABS Take 1 tablet by mouth daily, Historical Med      sodium chloride (OCEAN) 0.65 % nasal spray  1 spray into each nostril as needed for congestion, Starting Fri 9/16/2022, Normal       !! - Potential duplicate medications found. Please discuss with provider.          No discharge procedures on file.    PDMP Review       None            ED Provider  Electronically Signed by             ANN Lerner  01/02/24 6959